# Patient Record
Sex: MALE | Race: BLACK OR AFRICAN AMERICAN | NOT HISPANIC OR LATINO | URBAN - METROPOLITAN AREA
[De-identification: names, ages, dates, MRNs, and addresses within clinical notes are randomized per-mention and may not be internally consistent; named-entity substitution may affect disease eponyms.]

---

## 2021-01-18 ENCOUNTER — INPATIENT (INPATIENT)
Facility: HOSPITAL | Age: 61
LOS: 3 days | Discharge: HOME | DRG: 552 | End: 2021-01-22
Attending: SURGERY | Admitting: SURGERY
Payer: MEDICAID

## 2021-01-18 VITALS
TEMPERATURE: 98 F | RESPIRATION RATE: 18 BRPM | OXYGEN SATURATION: 96 % | SYSTOLIC BLOOD PRESSURE: 161 MMHG | HEART RATE: 76 BPM | WEIGHT: 259.93 LBS | DIASTOLIC BLOOD PRESSURE: 90 MMHG

## 2021-01-18 DIAGNOSIS — S12.9XXA FRACTURE OF NECK, UNSPECIFIED, INITIAL ENCOUNTER: ICD-10-CM

## 2021-01-18 LAB
ALBUMIN SERPL ELPH-MCNC: 3.4 G/DL — SIGNIFICANT CHANGE UP (ref 3.3–5)
ALP SERPL-CCNC: 103 U/L — SIGNIFICANT CHANGE UP (ref 40–120)
ALT FLD-CCNC: 12 U/L — SIGNIFICANT CHANGE UP (ref 10–45)
ANION GAP SERPL CALC-SCNC: 10 MMOL/L — SIGNIFICANT CHANGE UP (ref 5–17)
APTT BLD: 36.3 SEC — HIGH (ref 27.5–35.5)
AST SERPL-CCNC: 11 U/L — SIGNIFICANT CHANGE UP (ref 10–40)
BASE EXCESS BLDV CALC-SCNC: 0.5 MMOL/L — SIGNIFICANT CHANGE UP (ref -2–2)
BASOPHILS # BLD AUTO: 0.01 K/UL — SIGNIFICANT CHANGE UP (ref 0–0.2)
BASOPHILS NFR BLD AUTO: 0.1 % — SIGNIFICANT CHANGE UP (ref 0–2)
BILIRUB SERPL-MCNC: 0.4 MG/DL — SIGNIFICANT CHANGE UP (ref 0.2–1.2)
BLD GP AB SCN SERPL QL: NEGATIVE — SIGNIFICANT CHANGE UP
BUN SERPL-MCNC: 11 MG/DL — SIGNIFICANT CHANGE UP (ref 7–23)
CA-I SERPL-SCNC: 1.21 MMOL/L — SIGNIFICANT CHANGE UP (ref 1.12–1.3)
CALCIUM SERPL-MCNC: 9.5 MG/DL — SIGNIFICANT CHANGE UP (ref 8.4–10.5)
CHLORIDE BLDV-SCNC: 108 MMOL/L — SIGNIFICANT CHANGE UP (ref 96–108)
CHLORIDE SERPL-SCNC: 104 MMOL/L — SIGNIFICANT CHANGE UP (ref 96–108)
CO2 BLDV-SCNC: 26 MMOL/L — SIGNIFICANT CHANGE UP (ref 22–30)
CO2 SERPL-SCNC: 23 MMOL/L — SIGNIFICANT CHANGE UP (ref 22–31)
CREAT SERPL-MCNC: 0.66 MG/DL — SIGNIFICANT CHANGE UP (ref 0.5–1.3)
EOSINOPHIL # BLD AUTO: 0.01 K/UL — SIGNIFICANT CHANGE UP (ref 0–0.5)
EOSINOPHIL NFR BLD AUTO: 0.1 % — SIGNIFICANT CHANGE UP (ref 0–6)
GAS PNL BLDV: 133 MMOL/L — LOW (ref 135–145)
GAS PNL BLDV: SIGNIFICANT CHANGE UP
GAS PNL BLDV: SIGNIFICANT CHANGE UP
GLUCOSE BLDC GLUCOMTR-MCNC: 169 MG/DL — HIGH (ref 70–99)
GLUCOSE BLDC GLUCOMTR-MCNC: 249 MG/DL — HIGH (ref 70–99)
GLUCOSE BLDV-MCNC: 119 MG/DL — HIGH (ref 70–99)
GLUCOSE SERPL-MCNC: 117 MG/DL — HIGH (ref 70–99)
HCO3 BLDV-SCNC: 25 MMOL/L — SIGNIFICANT CHANGE UP (ref 21–29)
HCT VFR BLD CALC: 35.7 % — LOW (ref 39–50)
HCT VFR BLDA CALC: 40 % — SIGNIFICANT CHANGE UP (ref 39–50)
HGB BLD CALC-MCNC: 13.1 G/DL — SIGNIFICANT CHANGE UP (ref 13–17)
HGB BLD-MCNC: 12 G/DL — LOW (ref 13–17)
IMM GRANULOCYTES NFR BLD AUTO: 0.1 % — SIGNIFICANT CHANGE UP (ref 0–1.5)
INR BLD: 1.14 RATIO — SIGNIFICANT CHANGE UP (ref 0.88–1.16)
LACTATE BLDV-MCNC: 0.9 MMOL/L — SIGNIFICANT CHANGE UP (ref 0.7–2)
LYMPHOCYTES # BLD AUTO: 2.06 K/UL — SIGNIFICANT CHANGE UP (ref 1–3.3)
LYMPHOCYTES # BLD AUTO: 24.1 % — SIGNIFICANT CHANGE UP (ref 13–44)
MCHC RBC-ENTMCNC: 30.4 PG — SIGNIFICANT CHANGE UP (ref 27–34)
MCHC RBC-ENTMCNC: 33.6 GM/DL — SIGNIFICANT CHANGE UP (ref 32–36)
MCV RBC AUTO: 90.4 FL — SIGNIFICANT CHANGE UP (ref 80–100)
MONOCYTES # BLD AUTO: 0.66 K/UL — SIGNIFICANT CHANGE UP (ref 0–0.9)
MONOCYTES NFR BLD AUTO: 7.7 % — SIGNIFICANT CHANGE UP (ref 2–14)
NEUTROPHILS # BLD AUTO: 5.81 K/UL — SIGNIFICANT CHANGE UP (ref 1.8–7.4)
NEUTROPHILS NFR BLD AUTO: 67.9 % — SIGNIFICANT CHANGE UP (ref 43–77)
NRBC # BLD: 0 /100 WBCS — SIGNIFICANT CHANGE UP (ref 0–0)
OTHER CELLS CSF MANUAL: 17 ML/DL — LOW (ref 18–22)
PCO2 BLDV: 42 MMHG — SIGNIFICANT CHANGE UP (ref 35–50)
PH BLDV: 7.39 — SIGNIFICANT CHANGE UP (ref 7.35–7.45)
PLATELET # BLD AUTO: 300 K/UL — SIGNIFICANT CHANGE UP (ref 150–400)
PO2 BLDV: 68 MMHG — HIGH (ref 25–45)
POTASSIUM BLDV-SCNC: 3.6 MMOL/L — SIGNIFICANT CHANGE UP (ref 3.5–5.3)
POTASSIUM SERPL-MCNC: 3.8 MMOL/L — SIGNIFICANT CHANGE UP (ref 3.5–5.3)
POTASSIUM SERPL-SCNC: 3.8 MMOL/L — SIGNIFICANT CHANGE UP (ref 3.5–5.3)
PROT SERPL-MCNC: 6.3 G/DL — SIGNIFICANT CHANGE UP (ref 6–8.3)
PROTHROM AB SERPL-ACNC: 13.6 SEC — SIGNIFICANT CHANGE UP (ref 10.6–13.6)
RBC # BLD: 3.95 M/UL — LOW (ref 4.2–5.8)
RBC # FLD: 14.3 % — SIGNIFICANT CHANGE UP (ref 10.3–14.5)
RH IG SCN BLD-IMP: POSITIVE — SIGNIFICANT CHANGE UP
SAO2 % BLDV: 93 % — HIGH (ref 67–88)
SARS-COV-2 RNA SPEC QL NAA+PROBE: SIGNIFICANT CHANGE UP
SODIUM SERPL-SCNC: 137 MMOL/L — SIGNIFICANT CHANGE UP (ref 135–145)
WBC # BLD: 8.56 K/UL — SIGNIFICANT CHANGE UP (ref 3.8–10.5)
WBC # FLD AUTO: 8.56 K/UL — SIGNIFICANT CHANGE UP (ref 3.8–10.5)

## 2021-01-18 PROCEDURE — 99285 EMERGENCY DEPT VISIT HI MDM: CPT

## 2021-01-18 RX ORDER — DEXTROSE 50 % IN WATER 50 %
12.5 SYRINGE (ML) INTRAVENOUS ONCE
Refills: 0 | Status: DISCONTINUED | OUTPATIENT
Start: 2021-01-18 | End: 2021-01-22

## 2021-01-18 RX ORDER — DEXTROSE 50 % IN WATER 50 %
15 SYRINGE (ML) INTRAVENOUS ONCE
Refills: 0 | Status: DISCONTINUED | OUTPATIENT
Start: 2021-01-18 | End: 2021-01-20

## 2021-01-18 RX ORDER — METOPROLOL TARTRATE 50 MG
25 TABLET ORAL DAILY
Refills: 0 | Status: DISCONTINUED | OUTPATIENT
Start: 2021-01-18 | End: 2021-01-22

## 2021-01-18 RX ORDER — DEXTROSE 50 % IN WATER 50 %
25 SYRINGE (ML) INTRAVENOUS ONCE
Refills: 0 | Status: DISCONTINUED | OUTPATIENT
Start: 2021-01-18 | End: 2021-01-20

## 2021-01-18 RX ORDER — ENOXAPARIN SODIUM 100 MG/ML
40 INJECTION SUBCUTANEOUS DAILY
Refills: 0 | Status: DISCONTINUED | OUTPATIENT
Start: 2021-01-18 | End: 2021-01-22

## 2021-01-18 RX ORDER — RISPERIDONE 4 MG/1
3 TABLET ORAL DAILY
Refills: 0 | Status: DISCONTINUED | OUTPATIENT
Start: 2021-01-18 | End: 2021-01-20

## 2021-01-18 RX ORDER — LEVOTHYROXINE SODIUM 125 MCG
25 TABLET ORAL DAILY
Refills: 0 | Status: DISCONTINUED | OUTPATIENT
Start: 2021-01-18 | End: 2021-01-22

## 2021-01-18 RX ORDER — DOXAZOSIN MESYLATE 4 MG
8 TABLET ORAL AT BEDTIME
Refills: 0 | Status: DISCONTINUED | OUTPATIENT
Start: 2021-01-18 | End: 2021-01-22

## 2021-01-18 RX ORDER — SODIUM CHLORIDE 9 MG/ML
1000 INJECTION, SOLUTION INTRAVENOUS
Refills: 0 | Status: DISCONTINUED | OUTPATIENT
Start: 2021-01-18 | End: 2021-01-22

## 2021-01-18 RX ORDER — SODIUM CHLORIDE 9 MG/ML
1000 INJECTION, SOLUTION INTRAVENOUS
Refills: 0 | Status: DISCONTINUED | OUTPATIENT
Start: 2021-01-18 | End: 2021-01-20

## 2021-01-18 RX ORDER — ATORVASTATIN CALCIUM 80 MG/1
40 TABLET, FILM COATED ORAL AT BEDTIME
Refills: 0 | Status: DISCONTINUED | OUTPATIENT
Start: 2021-01-18 | End: 2021-01-22

## 2021-01-18 RX ORDER — ROSUVASTATIN CALCIUM 5 MG/1
1 TABLET ORAL
Qty: 0 | Refills: 0 | DISCHARGE

## 2021-01-18 RX ORDER — INSULIN LISPRO 100/ML
VIAL (ML) SUBCUTANEOUS AT BEDTIME
Refills: 0 | Status: DISCONTINUED | OUTPATIENT
Start: 2021-01-18 | End: 2021-01-20

## 2021-01-18 RX ORDER — INSULIN LISPRO 100/ML
VIAL (ML) SUBCUTANEOUS
Refills: 0 | Status: DISCONTINUED | OUTPATIENT
Start: 2021-01-18 | End: 2021-01-20

## 2021-01-18 RX ORDER — GLUCAGON INJECTION, SOLUTION 0.5 MG/.1ML
1 INJECTION, SOLUTION SUBCUTANEOUS ONCE
Refills: 0 | Status: DISCONTINUED | OUTPATIENT
Start: 2021-01-18 | End: 2021-01-20

## 2021-01-18 RX ADMIN — Medication 8 MILLIGRAM(S): at 22:16

## 2021-01-18 RX ADMIN — ATORVASTATIN CALCIUM 40 MILLIGRAM(S): 80 TABLET, FILM COATED ORAL at 22:16

## 2021-01-18 RX ADMIN — Medication 25 MICROGRAM(S): at 11:54

## 2021-01-18 RX ADMIN — Medication 25 MILLIGRAM(S): at 11:54

## 2021-01-18 RX ADMIN — RISPERIDONE 3 MILLIGRAM(S): 4 TABLET ORAL at 11:54

## 2021-01-18 NOTE — ED PROVIDER NOTE - OBJECTIVE STATEMENT
60M PMH BPH HTN HLD DM schizophrenia, 60M PMH BPH HTN HLD DM schizophrenia transferred from [facility] ED C4-5 transverse process fracture after unwitnessed fall from standing near bathroom w/ unknown downtime. As per ED transfer paperwork, pt originally endorsed L sided shoulder pain which pt does not currently endorse, pt unable to explain if he has weakness/chronic pain in his left shoulder. Pt has sluggish response to questions, only responds to questions intermittently, but follows commands. Pt states he feels well, has no complaints, does not remember how he fell, states he feels like he can walk but has been feeling weak. From facility, pt had L shoulder xrays showing no fracture but suggestive of chronic rotator cuff atrophy, neg pelvis + chest xrays, c4-5 fx on CT.

## 2021-01-18 NOTE — CONSULT NOTE ADULT - ASSESSMENT
CRYSTAL GRIJALVA  60M w/ pmh of schizophrenia and lives in a group home presents after a unwitnessed fall. Patient states he tripped over his feet in the bathroom and fell onto his L shoulder. Initially was complaining of L shoulder pain. No headache, neck pain, or radiculopathy or numbness. on daily asa. At OSH , L TP fx at c4 and 5 both extending into transverse foramen. CT neg, placed in C collar.  Exam: aox3, FC, perrl RUE 5/5, LUE Delt 2/3, otherwise 5/5, no hoffmans, LE 5/5.   - no acute neurosurgical intervention  -CTA neck to assess vertebral artery on L  -MRI c spine CRYSTAL GRIJALVA  60M w/ pmh of schizophrenia and lives in a group home presents after a unwitnessed fall. Patient states he tripped over his feet in the bathroom and fell onto his L shoulder. Initially was complaining of L shoulder pain. No headache, neck pain, or radiculopathy or numbness. on daily asa. At OSH , L TP fx at c4 and 5 both extending into transverse foramen. CT neg, placed in C collar. Patient is a poor historian and is very slow to respond.   Exam: aox3, FC, perrl RUE 5/5, LUE Delt 2/3, otherwise 5/5, no hoffmans, LE 5/5.   - no acute neurosurgical intervention  -CTA neck to assess vertebral artery on L  -MRI c spine, potential CDU candidate  -cont c collar   CRYSTAL GRIJALVA  60M w/ pmh of schizophrenia and lives in a group home presents after a unwitnessed fall. Patient states he tripped over his feet in the bathroom and fell onto his L shoulder. Initially was complaining of L shoulder pain. No headache, neck pain, or radiculopathy or numbness. on daily asa. At OSH , L TP fx at c4 and 5 both extending into transverse foramen. CT neg, placed in C collar. Patient is a poor historian and is very slow to respond.   Exam: aox3, FC, perrl RUE 5/5, LUE Delt 2/3, otherwise 5/5, no hoffmans, LE 5/5.   - no acute neurosurgical intervention  -MRI/ MRA c spine, potential CDU candidate  -cont c collar

## 2021-01-18 NOTE — H&P ADULT - NSHPREVIEWOFSYSTEMS_GEN_ALL_CORE
REVIEW OF SYSTEMS:  General: denies weight change, fever or fatigue  HEENT: denies sore throat, hoarseness  Respiratory: denies cough, shortness of breath at rest and on exertion, wheezing  Cardiovascular: denies chest pain, abnormal heart rhythm, PND, palpitations  Gastrointestinal: denies nausea, vomiting, diarrhea, bloody or black bowel movements  Genitourinary: denies frequent urination, painful urination, kidney disease  Neurological: denies seizures, headaches  Muscoloskeletal: denies any joint pains  Psychiatric: denies depression, anxiety

## 2021-01-18 NOTE — ED PROVIDER NOTE - ATTENDING CONTRIBUTION TO CARE
Pt transferred from OSH due to newly found c4/c5 transverse process fx from unwitnessed fall. imaging there otherwise neg for acute injury, other than L shoulder showing signs of rotator cuff arthropaty. this is consistent w pt's exam with limited L arm abduction past 45 deg. otherwise pt appears to be neuro intact without any acute deficits. clinical picture currently not showing signs of spinal cord injury. trauma surgery and neuro surgery consulted and aware. At the end of my shift, I signed off the care of the patient to oncoming physician. Plan is for await fianl trauma and neursurg recs, admission for further care.

## 2021-01-18 NOTE — PATIENT PROFILE ADULT - HARM RISK FACTORS
Abnormal liver function test    Cancer  Ovarian cancer s/p chemo 2012  Coronary artery disease involving native coronary artery of native heart, angina presence unspecified  MI  2011  Diabetes    Gastroesophageal reflux disease without esophagitis    High cholesterol    HTN (hypertension)    Obesity (BMI 30-39.9) yes

## 2021-01-18 NOTE — H&P ADULT - NSHPPHYSICALEXAM_GEN_ALL_CORE
General: well developed, well nourished, NAD  Neuro: alert and oriented, no focal deficits, moves all extremities spontaneously, C-collar in place, motor and sensation intact b/l UE and LE, RUE weakness to adduction >90degrees  HEENT: NCAT, EOMI, anicteric, mucosa moist  Respiratory: airway patent, respirations unlabored  CVS: regular rate and rhythm  Abdomen: soft, nontender, nondistended  Extremities: no edema, sensation and movement grossly intact  Skin: warm, dry, appropriate color

## 2021-01-18 NOTE — ED ADULT NURSE NOTE - NSIMPLEMENTINTERV_GEN_ALL_ED
Implemented All Fall Risk Interventions:  [place] to call system. Call bell, personal items and telephone within reach. Instruct patient to call for assistance. Room bathroom lighting operational. Non-slip footwear when patient is off stretcher. Physically safe environment: no spills, clutter or unnecessary equipment. Stretcher in lowest position, wheels locked, appropriate side rails in place. Provide visual cue, wrist band, yellow gown, etc. Monitor gait and stability. Monitor for mental status changes and reorient to person, place, and time. Review medications for side effects contributing to fall risk. Reinforce activity limits and safety measures with patient and family.

## 2021-01-18 NOTE — H&P ADULT - ASSESSMENT
60M PMH BPH, HTN, HLD, DM2, schizophrenia, seizures, residing at [facility] presenting after unwitnessed fall on ASA, found to have L C4-5 TP fx.     - Admit to Trauma Surgery  - Appreciate Neurosurgical evaluation. Pending MRI/MRA for evaluation of ligamentous injury and BCVI. Per Neurosurgery no concern for spinal cord injury given exam and imaging findings.  - R deltoid weakness likely 2/2 chronic rotator cuff injury  - Med rec to be completed, will contact patient's residence for further information  - DVT ppx: lovenox  - Regular diet  - PT donald Amador, PGY2  Trauma/ACS [id number]

## 2021-01-18 NOTE — ED PROVIDER NOTE - CARE PLAN
Principal Discharge DX:	Cervical spine fracture   Principal Discharge DX:	Cervical spine fracture  Secondary Diagnosis:	Schizophrenia, unspecified type  Secondary Diagnosis:	Rotator cuff arthropathy of left shoulder

## 2021-01-18 NOTE — ED PROVIDER NOTE - PHYSICAL EXAMINATION
CONSTITUTIONAL: comfortable appearing, in c-collar  SKIN: Warm dry, normal skin turgor, no abrasions/lacerations visible on exam  EYES: EOMI, PERRLA, no scleral icterus, conjunctiva pink  NECK: in C-collar  CARD: RRR, no murmurs.  RESP: clear to ausculation b/l. No crackles or wheezing. No chest wall or clavicular tenderness  ABD: soft, non-tender, non-distended, no rebound or guarding.  EXT:  No bony tenderness over L shoulder 5/5 str upper and lower extremities except for 4/5 L shoulder flexion, adduction, 5/5  strength, no pedal edema, no calf tenderness  NEURO: Motor exam as above. Pt did not endorse sensory deficits, Pt did not cooperate w/ cranial nerve exam. Pt has shuffling gait, states he feels unsteady without assistance  PSYCH: sluggish intermittent response, flat affect CONSTITUTIONAL: comfortable appearing, in c-collar  SKIN: Warm dry, normal skin turgor, no abrasions/lacerations visible on exam  EYES: EOMI, PERRLA, no scleral icterus, conjunctiva pink  NECK: in C-collar  CARD: RRR, no murmurs.  RESP: clear to ausculation b/l. No crackles or wheezing. No chest wall or clavicular tenderness  ABD: soft, non-tender, non-distended, no rebound or guarding.  EXT:  No bony tenderness over L shoulder 5/5 str upper and lower extremities except decreased adduction of L shoulder above 45 deg, 5/5  strength, no pedal edema, no calf tenderness  NEURO: Motor exam without deficits, limited by inability to adduct L shoulder. Pt did not endorse sensory deficits, Pt did not cooperate w/ cranial nerve exam. Pt has shuffling gait, states he feels unsteady without assistance  PSYCH: sluggish intermittent response, flat affect

## 2021-01-18 NOTE — ED PROVIDER NOTE - CLINICAL SUMMARY MEDICAL DECISION MAKING FREE TEXT BOX
60M PMH BPH HTN HLD DM schizophrenia transferred from [place] ED C4-5 transverse process fracture after unwitnessed fall from standing near bathroom w/ unknown downtime, c4-5 transverse process fx, L shoulder flexion weakness, Neurosx consult for cspine fracture, pre-op labs, trauma evaluation, consider re-imaging if necessary

## 2021-01-18 NOTE — H&P ADULT - HISTORY OF PRESENT ILLNESS
60M PMH BPH, HTN, HLD, DM2, schizophrenia, seizures, residing at [facility] presenting after unwitnessed fall on ASA per OSH. Patient states that he tripped over his feet in the bathroom, fell onto left shoulder, presented to OSH with left shoulder pain. CT head, C-spine obtained showing no intracranial injury, fractures of left C4-C5 transverse processes. Left shoulder Xray showing no fracture or dislocation, degenerative changes with evidence of chronic rotator cuff. Pelvic Xray showing severe degenerative change involving both hip joints, no evidence of acute process. Patient transferred to [facility] ED for Trauma and Neurosurgery evaluation.     Upon evaluation in [facility] ED patient denies any pain, denies LOC, no complaints.

## 2021-01-18 NOTE — CONSULT NOTE ADULT - SUBJECTIVE AND OBJECTIVE BOX
p ([id number])     HPI:    60M w/ pmh of schizophrenia and lives in a group home presents after a unwitnessed fall. Patient states he tripped over his feet in the bathroom and fell onto his L shoulder. Initially was complaining of L shoulder pain. No headache, neck pain, or radiculopathy or numbness. on daily asa.     Exam:  aaox3, FC, perrl RUE 5/5, LUE Delt 2/3, otherwise 5/5, no hoffmans, LE 5/5.     --Anticoagulation:    =====================  PAST MEDICAL HISTORY     PAST SURGICAL HISTORY         MEDICATIONS:  Antibiotics:    Neuro:    Other:      SOCIAL HISTORY:   Occupation:   Marital Status:     FAMILY HISTORY:      ROS: Negative except per HPI    LABS:                          12.0   8.56  )-----------( 300      ( 18 Jan 2021 04:36 )             35.7

## 2021-01-18 NOTE — ED PROVIDER NOTE - NS ED ROS FT
Constitutional:  (-) fever, (-) chills, (-) lethargy  Cardiac: (-) chest pain (-) palpitations  Respiratory:  (-) cough (-) respiratory distress.   GI:  (-) nausea (-) vomiting (-) abdominal pain.  MS:  (-) back pain (-) joint pain.  Neuro:  (-) headache (-) numbness (-) tingling

## 2021-01-18 NOTE — ED ADULT NURSE NOTE - OBJECTIVE STATEMENT
patient is a 60 year old male with a PMH of seizures who BIBEMS as a transfer from [facility]. as per EMS patient had an unwitnessed fall at his assisted living facility. unknown mechanism of fall. unknown LOC or head injury. patient is awake, aaox3 with delayed speech (patient baseline), PERRL pupils 2mm bilaterally, strength and sensation equal to upper and lower extremities bilaterally, lungs CTA bilaterally, abd soft, nondistended, nontender, cap refill <3, radial pulses +2 bilaterally. patient denies chest pain, shortness of breath, ha, dizziness, weakness, numbness or tingling, fever, chills, cough, abd pain, back pain, changes in bowel or bladder, hematuria, bloody stool. patient resting on stretcher. MD at bedside to assess. see chart for neuroflowsheet. c-collar placed before patient arrival.

## 2021-01-18 NOTE — H&P ADULT - NSHPLABSRESULTS_GEN_ALL_CORE
12.0   8.56  )-----------( 300      ( 18 Jan 2021 04:36 )             35.7       01-18    137  |  104  |  11  ----------------------------<  117<H>  3.8   |  23  |  0.66    Ca    9.5      18 Jan 2021 04:36    TPro  6.3  /  Alb  3.4  /  TBili  0.4  /  DBili  x   /  AST  11  /  ALT  12  /  AlkPhos  103  01-18                  PT/INR - ( 18 Jan 2021 04:36 )   PT: 13.6 sec;   INR: 1.14 ratio         PTT - ( 18 Jan 2021 04:36 )  PTT:36.3 sec    Lactate Trend            CAPILLARY BLOOD GLUCOSE

## 2021-01-19 DIAGNOSIS — R45.1 RESTLESSNESS AND AGITATION: ICD-10-CM

## 2021-01-19 DIAGNOSIS — F20.9 SCHIZOPHRENIA, UNSPECIFIED: ICD-10-CM

## 2021-01-19 LAB
A1C WITH ESTIMATED AVERAGE GLUCOSE RESULT: 5.6 % — SIGNIFICANT CHANGE UP (ref 4–5.6)
ANION GAP SERPL CALC-SCNC: 9 MMOL/L — SIGNIFICANT CHANGE UP (ref 5–17)
BUN SERPL-MCNC: 13 MG/DL — SIGNIFICANT CHANGE UP (ref 7–23)
CALCIUM SERPL-MCNC: 9.3 MG/DL — SIGNIFICANT CHANGE UP (ref 8.4–10.5)
CHLORIDE SERPL-SCNC: 102 MMOL/L — SIGNIFICANT CHANGE UP (ref 96–108)
CO2 SERPL-SCNC: 25 MMOL/L — SIGNIFICANT CHANGE UP (ref 22–31)
CREAT SERPL-MCNC: 0.88 MG/DL — SIGNIFICANT CHANGE UP (ref 0.5–1.3)
ESTIMATED AVERAGE GLUCOSE: 114 MG/DL — SIGNIFICANT CHANGE UP (ref 68–114)
GLUCOSE BLDC GLUCOMTR-MCNC: 181 MG/DL — HIGH (ref 70–99)
GLUCOSE SERPL-MCNC: 94 MG/DL — SIGNIFICANT CHANGE UP (ref 70–99)
HCT VFR BLD CALC: 37.7 % — LOW (ref 39–50)
HGB BLD-MCNC: 12.3 G/DL — LOW (ref 13–17)
MAGNESIUM SERPL-MCNC: 1.9 MG/DL — SIGNIFICANT CHANGE UP (ref 1.6–2.6)
MCHC RBC-ENTMCNC: 30 PG — SIGNIFICANT CHANGE UP (ref 27–34)
MCHC RBC-ENTMCNC: 32.6 GM/DL — SIGNIFICANT CHANGE UP (ref 32–36)
MCV RBC AUTO: 92 FL — SIGNIFICANT CHANGE UP (ref 80–100)
NRBC # BLD: 0 /100 WBCS — SIGNIFICANT CHANGE UP (ref 0–0)
PHOSPHATE SERPL-MCNC: 3.4 MG/DL — SIGNIFICANT CHANGE UP (ref 2.5–4.5)
PLATELET # BLD AUTO: 303 K/UL — SIGNIFICANT CHANGE UP (ref 150–400)
POTASSIUM SERPL-MCNC: 4.2 MMOL/L — SIGNIFICANT CHANGE UP (ref 3.5–5.3)
POTASSIUM SERPL-SCNC: 4.2 MMOL/L — SIGNIFICANT CHANGE UP (ref 3.5–5.3)
RBC # BLD: 4.1 M/UL — LOW (ref 4.2–5.8)
RBC # FLD: 14.6 % — HIGH (ref 10.3–14.5)
SODIUM SERPL-SCNC: 136 MMOL/L — SIGNIFICANT CHANGE UP (ref 135–145)
WBC # BLD: 7.59 K/UL — SIGNIFICANT CHANGE UP (ref 3.8–10.5)
WBC # FLD AUTO: 7.59 K/UL — SIGNIFICANT CHANGE UP (ref 3.8–10.5)

## 2021-01-19 PROCEDURE — 99232 SBSQ HOSP IP/OBS MODERATE 35: CPT

## 2021-01-19 PROCEDURE — 93010 ELECTROCARDIOGRAM REPORT: CPT

## 2021-01-19 PROCEDURE — 90792 PSYCH DIAG EVAL W/MED SRVCS: CPT

## 2021-01-19 RX ORDER — DIVALPROEX SODIUM 500 MG/1
1000 TABLET, DELAYED RELEASE ORAL AT BEDTIME
Refills: 0 | Status: DISCONTINUED | OUTPATIENT
Start: 2021-01-19 | End: 2021-01-22

## 2021-01-19 RX ORDER — DIVALPROEX SODIUM 500 MG/1
750 TABLET, DELAYED RELEASE ORAL DAILY
Refills: 0 | Status: DISCONTINUED | OUTPATIENT
Start: 2021-01-19 | End: 2021-01-22

## 2021-01-19 RX ORDER — MAGNESIUM SULFATE 500 MG/ML
2 VIAL (ML) INJECTION ONCE
Refills: 0 | Status: DISCONTINUED | OUTPATIENT
Start: 2021-01-19 | End: 2021-01-19

## 2021-01-19 RX ORDER — HALOPERIDOL DECANOATE 100 MG/ML
5 INJECTION INTRAMUSCULAR EVERY 6 HOURS
Refills: 0 | Status: DISCONTINUED | OUTPATIENT
Start: 2021-01-19 | End: 2021-01-22

## 2021-01-19 RX ORDER — SODIUM CHLORIDE 9 MG/ML
1000 INJECTION, SOLUTION INTRAVENOUS
Refills: 0 | Status: DISCONTINUED | OUTPATIENT
Start: 2021-01-19 | End: 2021-01-20

## 2021-01-19 RX ORDER — DIPHENHYDRAMINE HCL 50 MG
25 CAPSULE ORAL EVERY 6 HOURS
Refills: 0 | Status: DISCONTINUED | OUTPATIENT
Start: 2021-01-19 | End: 2021-01-22

## 2021-01-19 RX ADMIN — RISPERIDONE 3 MILLIGRAM(S): 4 TABLET ORAL at 14:51

## 2021-01-19 RX ADMIN — Medication 25 MILLIGRAM(S): at 18:05

## 2021-01-19 RX ADMIN — Medication 25 MICROGRAM(S): at 05:48

## 2021-01-19 RX ADMIN — ENOXAPARIN SODIUM 40 MILLIGRAM(S): 100 INJECTION SUBCUTANEOUS at 14:50

## 2021-01-19 RX ADMIN — Medication 8 MILLIGRAM(S): at 22:27

## 2021-01-19 RX ADMIN — Medication 2 MILLIGRAM(S): at 11:42

## 2021-01-19 RX ADMIN — Medication 25 MILLIGRAM(S): at 11:42

## 2021-01-19 RX ADMIN — Medication 2 MILLIGRAM(S): at 18:04

## 2021-01-19 RX ADMIN — HALOPERIDOL DECANOATE 5 MILLIGRAM(S): 100 INJECTION INTRAMUSCULAR at 11:42

## 2021-01-19 RX ADMIN — DIVALPROEX SODIUM 750 MILLIGRAM(S): 500 TABLET, DELAYED RELEASE ORAL at 14:50

## 2021-01-19 RX ADMIN — Medication 25 MILLIGRAM(S): at 05:48

## 2021-01-19 RX ADMIN — DIVALPROEX SODIUM 1000 MILLIGRAM(S): 500 TABLET, DELAYED RELEASE ORAL at 22:27

## 2021-01-19 RX ADMIN — HALOPERIDOL DECANOATE 5 MILLIGRAM(S): 100 INJECTION INTRAMUSCULAR at 18:04

## 2021-01-19 RX ADMIN — ATORVASTATIN CALCIUM 40 MILLIGRAM(S): 80 TABLET, FILM COATED ORAL at 22:27

## 2021-01-19 NOTE — DISCHARGE NOTE PROVIDER - HOSPITAL COURSE
60M PMH BPH, HTN, HLD, DM2, schizophrenia, seizures, residing at [facility] presenting after unwitnessed fall on ASA per OSH. Patient states that he tripped over his feet in the bathroom, fell onto left shoulder, presented to OSH with left shoulder pain. CT head, C-spine obtained showing no intracranial injury, fractures of left C4-C5 transverse processes. Left shoulder Xray showing no fracture or dislocation, degenerative changes with evidence of chronic rotator cuff. Pelvic Xray showing severe degenerative change involving both hip joints, no evidence of acute process. Patient transferred to [facility] ED for Trauma and Neurosurgery evaluation.     Upon evaluation in [facility] ED patient denies any pain, denies LOC, no complaints. Admit to Trauma Surgery.  Appreciate Neurosurgical evaluation. Pending MRI/MRA for evaluation of ligamentous injury and BCVI. Per Neurosurgery no concern for spinal cord injury given exam and imaging findings.  R deltoid weakness likely 2/2 chronic rotator cuff injury.  Psych consulted: rec meds for acute agitation and restarted home meds.  Patient had MRI showed _____________________. PT Eval: ______________.    60M PMH BPH, HTN, HLD, DM2, schizophrenia, seizures, residing at [facility] presenting after unwitnessed fall on ASA per OSH. Patient states that he tripped over his feet in the bathroom, fell onto left shoulder, presented to OSH with left shoulder pain. CT head, C-spine obtained showing no intracranial injury, fractures of left C4-C5 transverse processes. Left shoulder Xray showing no fracture or dislocation, degenerative changes with evidence of chronic rotator cuff. Pelvic Xray showing severe degenerative change involving both hip joints, no evidence of acute process. Patient transferred to [facility] ED for Trauma and Neurosurgery evaluation.     Upon evaluation in [facility] ED patient denies any pain, denies LOC, no complaints. Admit to Trauma Surgery.  Appreciate Neurosurgical evaluation. Pending MRI/MRA for evaluation of ligamentous injury and BCVI. Per Neurosurgery no concern for spinal cord injury given exam and imaging findings.  R deltoid weakness likely 2/2 chronic rotator cuff injury.  Psych consulted: rec meds for acute agitation and restarted home meds.  Patient had MRI showed: neck: No traumatic vascular injury including at C4-C5 levels. MRA head: Atherosclerotic changes with mild to moderate stenosis of the right intracranial ICA and mild stenosis of the right PCA. PT recommended returning to group home with previous level of assist, as well as outpatient physical therapy services. A tertiary survey was attempted and patient refused. Psychiatry recommended Haldol 5mg IM q 6hrs prn agitation, Ativan 2mg IM q6hrs prn agitation and Benadryl 25mg IM q6hrs prn (for EPS prophylaxis). Neurosurgery reviewed the MRA neck and MR c-spine. No vert injury on MRA Trace L 4,5,6 facet edema, otherwise no ligamentous injury. Pt cleared to D/C C-collar. Pt should follow up outpatient with Dr. Pena. Pt will be discharged back to his group home. On day of discharge, the patients vitals are stable, he was tolerating diet, voiding adequatley, ambulating well and pain controlled. Pt will f/u with Dr. Pineda in 2 weeks and will f/u with his PCP in 1-2 weeks.   60M PMH BPH, HTN, HLD, DM2, schizophrenia, seizures, residing at [facility] presenting after unwitnessed fall on ASA per OSH. Patient states that he tripped over his feet in the bathroom, fell onto left shoulder, presented to OSH with left shoulder pain. CT head, C-spine obtained showing no intracranial injury, fractures of left C4-C5 transverse processes. Left shoulder Xray showing no fracture or dislocation, degenerative changes with evidence of chronic rotator cuff. Pelvic Xray showing severe degenerative change involving both hip joints, no evidence of acute process. Patient transferred to [facility] ED for Trauma and Neurosurgery evaluation.     Upon evaluation in [facility] ED patient denies any pain, denies LOC, no complaints. Admit to Trauma Surgery.  Appreciate Neurosurgical evaluation. Pending MRI/MRA for evaluation of ligamentous injury and BCVI. Per Neurosurgery no concern for spinal cord injury given exam and imaging findings.  R deltoid weakness likely 2/2 chronic rotator cuff injury.  Psych consulted: rec meds for acute agitation and restarted home meds.  Patient had MRI showed: neck: No traumatic vascular injury including at C4-C5 levels. MRA head: Atherosclerotic changes with mild to moderate stenosis of the right intracranial ICA and mild stenosis of the right PCA. PT recommended returning to group home with previous level of assist, as well as outpatient physical therapy services. A tertiary survey was attempted and patient refused. Psychiatry recommended Haldol 5mg IM q 6hrs prn agitation, Ativan 2mg IM q6hrs prn agitation and Benadryl 25mg IM q6hrs prn (for EPS prophylaxis). Neurosurgery reviewed the MRA neck and MR c-spine. No vert injury on MRA Trace L 4,5,6 facet edema, otherwise no ligamentous injury. Pt cleared to D/C C-collar. Pt should follow up outpatient with Dr. Pena. Pt will be discharged back to his group home. On day of discharge, the patients vitals are stable, he was tolerating diet, voiding adequatley, ambulating well and pain controlled. Pt will f/u with Dr. Pineda in 2 weeks and will f/u with his PCP in 1-2 weeks.

## 2021-01-19 NOTE — DISCHARGE NOTE PROVIDER - CARE PROVIDERS DIRECT ADDRESSES
[email or web address]

## 2021-01-19 NOTE — PROGRESS NOTE ADULT - SUBJECTIVE AND OBJECTIVE BOX
p ([id number])     HPI:    60M w/ pmh of schizophrenia and lives in a group home presents after a unwitnessed fall. Patient states he tripped over his feet in the bathroom and fell onto his L shoulder. Initially was complaining of L shoulder pain. No headache, neck pain, or radiculopathy or numbness. on daily asa.     Exam:  aaox3, FC, perrl RUE 5/5, LUE Delt 2/3, otherwise 5/5, no hoffmans, LE 5/5.     --Anticoagulation:    =====================  PAST MEDICAL HISTORY     PAST SURGICAL HISTORY         MEDICATIONS:  Antibiotics:    Neuro:    Other:      SOCIAL HISTORY:   Occupation:   Marital Status:     FAMILY HISTORY:      ROS: Negative except per HPI    LABS:                          12.0   8.56  )-----------( 300      ( 18 Jan 2021 04:36 )             35.7

## 2021-01-19 NOTE — BEHAVIORAL HEALTH ASSESSMENT NOTE - NSBHCHARTREVIEWLAB_PSY_A_CORE FT
12.3   7.59  )-----------( 303      ( 19 Jan 2021 06:49 )             37.7   01-19    136  |  102  |  13  ----------------------------<  94  4.2   |  25  |  0.88    Ca    9.3      19 Jan 2021 06:42  Phos  3.4     01-19  Mg     1.9     01-19    TPro  6.3  /  Alb  3.4  /  TBili  0.4  /  DBili  x   /  AST  11  /  ALT  12  /  AlkPhos  103  01-18

## 2021-01-19 NOTE — BEHAVIORAL HEALTH ASSESSMENT NOTE - HYGIENE
What Is The Reason For Today's Visit?: Full Body Skin Examination
What Is The Reason For Today's Visit? (Being Monitored For X): concerning skin lesions on an annual basis
How Severe Are Your Spot(S)?: moderate
Fair

## 2021-01-19 NOTE — PHYSICAL THERAPY INITIAL EVALUATION ADULT - ADDITIONAL COMMENTS
Pt unable to provide accurate social history, as per progress notes, Pt has a history of schizophrenia and lives in a group home. Pt unable to provide accurate social history, as per progress notes, Pt has a history of schizophrenia and lives in a group home. Pt was independent with all functional mobility without the use of an AD.

## 2021-01-19 NOTE — DISCHARGE NOTE PROVIDER - NSFOLLOWUPCLINICS_GEN_ALL_ED_FT
[facility] Psychiatry  Psychiatry  [id number] [place] [address]  Phone: [phone]  Fax:   Follow Up Time:

## 2021-01-19 NOTE — BEHAVIORAL HEALTH ASSESSMENT NOTE - NSBHCONSULTMEDAGITATION_PSY_A_CORE FT
Haldol 5mg IM q6hrs prn Agitation  Ativan 2mg IM q6hrs prn Agitation  Benadryl 25mg IM q6 hrs prn EPS prevention. Haldol 5mg IM q6hrs prn Agitation  Ativan 2mg IM q6hrs prn Agitation  Benadryl 25mg IM q6hrs prn was ordered  Benadryl 25mg IM q6 hrs prn EPS prevention.

## 2021-01-19 NOTE — PHYSICAL THERAPY INITIAL EVALUATION ADULT - PERTINENT HX OF CURRENT PROBLEM, REHAB EVAL
Pt is a 61 y/o M with PMH of HTN, DM2, schizophrenia, & seizures who presented after unwitnessed fall on ASA per OSH. Stated that he tripped over his feet in the bathroom and fell onto L shoulder. CT head & C-spine showed no intracranial injury, however revealed fractures of left C4-C5 transverse processes. Left shoulder X-Ray revealed no fracture or dislocation, degenerative changes with evidence of chronic rotator cuff. Pelvic X-Ray showed no evidence of acute process.

## 2021-01-19 NOTE — BEHAVIORAL HEALTH ASSESSMENT NOTE - RISK ASSESSMENT
Low Acute Suicide Risk pt remains a chronic risk to himself because of his diagnosis and paranoid psychosis but is not an acute risk to harm himself or others and has no current thoughts of harming himself or others.

## 2021-01-19 NOTE — PHYSICAL THERAPY INITIAL EVALUATION ADULT - MANUAL MUSCLE TESTING RESULTS, REHAB EVAL
Unable to assess due to pt not cooperating or following commands BLE/BUE: grossly assessed at least equal to or greater than 3/5

## 2021-01-19 NOTE — BEHAVIORAL HEALTH ASSESSMENT NOTE - NSBHCONSULTMEDS_PSY_A_CORE FT
-c/w home dose of Depakote 1000mg PO qdailt at bedtime -c/w home dose of Depakote 750mg daily 1000mg POqhs at bedtime  Risperidone 3mg daily  Risperidone Consta injection 50mg IM [id number]

## 2021-01-19 NOTE — BEHAVIORAL HEALTH ASSESSMENT NOTE - SUMMARY
Pt is a 61 yo male domiciled at [facility] w/ a PPHx significant for schizophrenia who presents to [facility] following transfer from OSH for management of C-spine fractures secondary to fall. Psychiatry was consulted for management of pt's outpatient psychiatric medications.    Pt w/ a PPHx of schizophrenia hospitalized for management of C-spine fractures now found to be agitated and requiring management of his psychiatric medications. Pt is a 61 yo man domiciled at [facility] w/ a PPHx significant for schizophrenia who presents to [facility] following transfer from [facility] for management of C-spine fractures secondary to fall. Psychiatry was consulted for management of pt's outpatient psychiatric medications, and his uncontrolled behavior today, yelling at his nurse and other staff.    On interview, pt was angry, agitated, and paranoid. Pt refused to respond to most questions. He stated he would only speak with his  and when told that psychiatry was going to evaluate his medications, he replied "you need to get your meds checked." Pt then began to angrily address "Jodi Robertson," stating that he does not love her anymore and that she was a person he knew who is dead and "should stay dead." Pt also insisted on taking a shower, despite being informed that he was not cleared to get out of bed secondary to his injuries.  He refused any help from staff for assistance.  Called patient's group home and spoke with both care coordination and nursing staff regarding his behavior and medications. Staff agree that his behavior has been challenging recently. He had no acute psychiatric issues and no recent thoughts of harming himself or others.  He was scheduled to receive his Risperidone Consta 50mg IM dose yesterday but did not receive it so this will be ordered today.   Pt w/ a PPHx of schizophrenia hospitalized for management of C-spine fractures now found to be agitated and requiring management of his psychiatric medications.  Haldol 5mg IM q 6hrs prn agitation  Ativan 2mg IM q6hrs prn agitation  Benadryl 25mg IM q6hrs prn (for EPS prophylaxis) Pt is a 59 yo man domiciled at [facility] w/ a PPHx significant for schizophrenia who presents to [facility] following transfer from [facility] for management of C-spine fractures secondary to fall. Psychiatry was consulted for management of pt's outpatient psychiatric medications, and his uncontrolled behavior today, yelling at his nurse and other staff.    On interview, pt was angry, agitated, and paranoid. Pt refused to respond to most questions. He stated he would only speak with his  and when told that psychiatry was going to evaluate his medications, he replied "you need to get your meds checked." Pt then began to angrily address "Jodi Robertson," stating that he does not love her anymore and that she was a person he knew who is dead and "should stay dead." Pt also insisted on taking a shower, despite being informed that he was not cleared to get out of bed secondary to his injuries.  He refused any help from staff for assistance.  Called patient's group home and spoke with both care coordination and nursing staff regarding his behavior and medications. Staff agree that his behavior has been challenging recently. He had no acute psychiatric issues and no recent thoughts of harming himself or others.  He was scheduled to receive his Risperidone Consta 50mg IM dose yesterday but did not receive it so this will be ordered today.   Pt w/ a PPHx of schizophrenia hospitalized for management of C-spine fractures now found to be agitated and requiring management of his psychiatric medications.  Please obtain ECG (would hold antipsychotic medications for QTC over 500ms)  Haldol 5mg IM q 6hrs prn agitation  Ativan 2mg IM q6hrs prn agitation  Benadryl 25mg IM q6hrs prn (for EPS prophylaxis)

## 2021-01-19 NOTE — CHART NOTE - NSCHARTNOTEFT_GEN_A_CORE
Pt was seen for psychiatric consultation and was agitated, angry, paranoid, yelling about Jodi Robertson harming him Pt was seen for psychiatric consultation and was agitated, angry, paranoid, yelling about Jodi Robertson harming him.  Pt is paranoid and demanding a  and refuses to speak with the psychiatric team arguing that they should get their own medications checked.  Pt did take his Risperidone 3mg yesterday, but missed his Depakote doses which were now ordered.  Pt started yelling and insisting that he would take a shower now though he has not yet been cleared for safety to walk.  Pt required PRNS for agitation and was ordered:  Haldol 5mg IM q6hrs prn Agitation  Ativan 2mg IM q6hrs prn Agitation  Benedryl 25mg IM q6 hrs prn EPS prevention

## 2021-01-19 NOTE — DISCHARGE NOTE PROVIDER - PROVIDER TOKENS
PROVIDER:[TOKEN:[[id number]],FOLLOWUP:[1 week]],PROVIDER:[TOKEN:[[id number]],FOLLOWUP:[2 weeks]]

## 2021-01-19 NOTE — PHYSICAL THERAPY INITIAL EVALUATION ADULT - STRENGTHENING, PT EVAL
4. GOAL: Pt will increase strength in BLE by at least 1/2 grade within 3 weeks to improve functional mobility.

## 2021-01-19 NOTE — PROGRESS NOTE ADULT - ASSESSMENT
60M PMH BPH, HTN, HLD, DM2, schizophrenia, seizures, residing at [facility] presenting after unwitnessed fall on ASA, found to have L C4-5 TP fx.     Plan:  - Pain management PRN  - c/w C-spine collar   - pending MRI/MRA per neurosurgery to rule out SC injury, although low suspicion for injury at this time  - PT evaluation today  - Tertiary survey today  - R deltoid weakness likely 2/2 chronic rotator cuff injury  - Med Rec completed and confirmed  - Regular diet  - DVT ppx: lovenox    Trauma Surgery  [id number] 60M PMH BPH, HTN, HLD, DM2, schizophrenia, seizures, residing at [facility] presenting after unwitnessed fall on ASA, found to have L C4-5 TP fx.     Plan:  - Pain management PRN  - c/w C-spine collar. Update: Patient removed c-spine collar today and is refusing to put it back on despite the risks being explained to him, to which he verbally expressed his understanding.   - pending MRI/MRA per neurosurgery to rule out SC injury, although low suspicion for injury at this time  - UPDATE: Patient will require MRA with sedation by anesthesia. Patient must have C-spine collar on prior to intubation per neurosx  - PT evaluation today  - Tertiary survey today: Update: Patient refused tertiary survey  - R deltoid weakness likely 2/2 chronic rotator cuff injury  - Med Rec completed and confirmed  - Regular diet  [facility]    Trauma Surgery  p.[phone]

## 2021-01-19 NOTE — DISCHARGE NOTE PROVIDER - NSDCMRMEDTOKEN_GEN_ALL_CORE_FT
aspirin 81 mg oral tablet: 1 tab(s) orally once a day  doxazosin 8 mg oral tablet: 1 tab(s) orally once a day  Eliquis 5 mg oral tablet: 1 tab(s) orally 2 times a day  levothyroxine 25 mcg (0.025 mg) oral tablet: 1 tab(s) orally once a day  metoprolol succinate 25 mg oral tablet, extended release: 1 tab(s) orally once a day  risperiDONE 3 mg oral tablet: 1 tab(s) orally once a day  rosuvastatin 10 mg oral tablet: 1 tab(s) orally once a day (at bedtime)   aspirin 81 mg oral tablet: 1 tab(s) orally once a day  divalproex sodium 250 mg oral delayed release tablet: 3 tab(s) orally once a day  divalproex sodium 500 mg oral delayed release tablet: 2 tab(s) orally once a day (at bedtime)  doxazosin 8 mg oral tablet: 1 tab(s) orally once a day  Eliquis 5 mg oral tablet: 1 tab(s) orally 2 times a day  levothyroxine 25 mcg (0.025 mg) oral tablet: 1 tab(s) orally once a day  metoprolol succinate 25 mg oral tablet, extended release: 1 tab(s) orally once a day  RisperDAL Consta 50 mg/2 weeks intramuscular injection, extended release:   risperiDONE 3 mg oral tablet: 1 tab(s) orally 2 times a day  rosuvastatin 10 mg oral tablet: 1 tab(s) orally once a day (at bedtime)

## 2021-01-19 NOTE — BEHAVIORAL HEALTH ASSESSMENT NOTE - HPI (INCLUDE ILLNESS QUALITY, SEVERITY, DURATION, TIMING, CONTEXT, MODIFYING FACTORS, ASSOCIATED SIGNS AND SYMPTOMS)
Pt is a 59 yo male domiciled at [facility] w/ a PPHx significant for schizophrenia who presents to [facility] following transfer from [facility] for management of C-spine fractures secondary to fall. Psychiatry was consulted for management of pt's outpatient psychiatric medications.    On interview today, pt is angry, agitated, and paranoid. Pt was minimally compliant with questioning. He states he would only speak with his  and when told that psychiatry was going to evaluate his medications, replied "you need to get your meds checked." Pt then began to angrily address "Jodi Robertson," stating that she was a person he knew who is dead and "should stay dead." Pt also insisted on taking a shower, refusing any help from staff for assistance. Pt is a 61 yo man domiciled at [facility] w/ a PPHx significant for schizophrenia who presents to [facility] following transfer from [facility] for management of C-spine fractures secondary to fall. Psychiatry was consulted for management of pt's outpatient psychiatric medications, and his uncontrolled behavior today, yelling at his nurse and other staff.    On interview today, pt is angry, agitated, and paranoid. Pt refused to respond to most questions. He states he would only speak with his  and when told that psychiatry was going to evaluate his medications, he replied "you need to get your meds checked." Pt then began to angrily address "Jodi Robertson," stating that he does not love her anymore and that she was a person he knew who is dead and "should stay dead." Pt also insisted on taking a shower, despite being informed that he was not cleared to get out of bed secondary to his injuries.  He was refusing any help from staff for assistance.  Called patient's group home and spoke with both care coordination and nursing staff regarding his behavior and medications. Staff agree that his behavior has been challenging recently. He had no acute psychiatric issues and no recent thoughts of harming himself or others.  He was scheduled to receive his Risperidone Consta 50mg IM dose yesterday but did not receive it so this will be ordered today.

## 2021-01-19 NOTE — PHYSICAL THERAPY INITIAL EVALUATION ADULT - DISCHARGE DISPOSITION, PT EVAL
TBD pending further evaluation Return to group home with assist, as well as outpatient physical therapy

## 2021-01-19 NOTE — DISCHARGE NOTE PROVIDER - CARE PROVIDER_API CALL
Daphnie Pena)  Neurosurgery  [address], [address] [place] [address]  Phone: [phone]  Fax: [phone]  Follow Up Time: 1 week    Danette Pineda)  Surgery; Surgical Critical Care  [address] [facility], [address] [place] [address]  Phone: [phone]  Fax: [phone]  Follow Up Time: 2 weeks

## 2021-01-19 NOTE — DISCHARGE NOTE PROVIDER - NSDCCPCAREPLAN_GEN_ALL_CORE_FT
PRINCIPAL DISCHARGE DIAGNOSIS  Diagnosis: Cervical spine fracture  Assessment and Plan of Treatment:       SECONDARY DISCHARGE DIAGNOSES  Diagnosis: Schizophrenia, unspecified type  Assessment and Plan of Treatment:      PRINCIPAL DISCHARGE DIAGNOSIS  Diagnosis: Cervical spine fracture  Assessment and Plan of Treatment: -Cleared to discontinue C-collar  -Follow up outpatient with Dr. Pena in 1 week      SECONDARY DISCHARGE DIAGNOSES  Diagnosis: Schizophrenia, unspecified type  Assessment and Plan of Treatment: -Continue your home medications

## 2021-01-19 NOTE — BEHAVIORAL HEALTH ASSESSMENT NOTE - NSBHCHARTREVIEWVS_PSY_A_CORE FT
Vital Signs Last 24 Hrs  T(C): 36.6 (19 Jan 2021 05:47), Max: 37 (19 Jan 2021 00:28)  T(F): 97.8 (19 Jan 2021 05:47), Max: 98.6 (19 Jan 2021 00:28)  HR: 71 (19 Jan 2021 05:47) (66 - 81)  BP: 144/73 (19 Jan 2021 05:47) (110/58 - 178/85)  BP(mean): --  RR: 18 (19 Jan 2021 05:47) (16 - 18)  SpO2: 97% (19 Jan 2021 05:47) (96% - 99%)

## 2021-01-19 NOTE — PROGRESS NOTE ADULT - ASSESSMENT
CRYSTAL GRIJALVA  60M w/ pmh of schizophrenia and lives in a group home presents after a unwitnessed fall. Patient states he tripped over his feet in the bathroom and fell onto his L shoulder. Initially was complaining of L shoulder pain. No headache, neck pain, or radiculopathy or numbness. on daily asa. At OSH , L TP fx at c4 and 5 both extending into transverse foramen. CT neg, placed in C collar. Patient is a poor historian and is very slow to respond.   Exam: aox3, FC, perrl RUE 5/5, LUE Delt 2/3, otherwise 5/5, no hoffmans, LE 5/5.   -MRI/ MRA c spine pending  -cont c collar  -R Delt likely 2/2 chronic rotator cuff injury per trauma

## 2021-01-19 NOTE — PROGRESS NOTE ADULT - SUBJECTIVE AND OBJECTIVE BOX
Trauma Surgery AM Progress Note      Injuries:  Left C4-C5 transverse process fracture      Subjective:  Pt seen and examined at bedside this AM.   No acute events overnight.  Denies chest pain, shortness of breath, dizziness, nausea, vomiting.      PMHx:  Fracture of neck, unspecified, initial encounter  HLD (hyperlipidemia)  HTN (hypertension)  Schizophrenia  Seizures  Cervical spine fracture  CERVICAL TX      Vital Signs Last 24 Hrs  T(C): 36.6 (19 Jan 2021 05:47), Max: 37 (19 Jan 2021 00:28)  T(F): 97.8 (19 Jan 2021 05:47), Max: 98.6 (19 Jan 2021 00:28)  HR: 71 (19 Jan 2021 05:47) (66 - 81)  BP: 144/73 (19 Jan 2021 05:47) (110/58 - 178/85)  BP(mean): --  RR: 18 (19 Jan 2021 05:47) (16 - 18)  SpO2: 97% (19 Jan 2021 05:47) (96% - 99%)      Physical Exam:  General Appearance:  Appears well, NAD, A&O x2  HEENT: c-spine collar in place.   Chest: Equal expansion bilaterally, equal breath sounds  CV: Pulse regular presently  Abdomen: Soft, nondistended, nontender.  Extremities:  Grossly symmetric, warm and well perfused 4x. mild left shoulder pain.        I&O's Summary  18 Jan 2021 07:01  -  19 Jan 2021 07:00  --------------------------------------------------------  IN: 240 mL / OUT: 0 mL / NET: 240 mL         LABs:                12.3   7.59  )-----------( 303      ( 19 Jan 2021 06:49 )             37.7     01-19    136  |  102  |  13  ----------------------------<  94  4.2   |  25  |  0.88    Ca    9.3      19 Jan 2021 06:42  Phos  3.4     01-19  Mg     1.9     01-19    TPro  6.3  /  Alb  3.4  /  TBili  0.4  /  DBili  x   /  AST  11  /  ALT  12  /  AlkPhos  103  01-18    PT/INR - ( 18 Jan 2021 04:36 )   PT: 13.6 sec;   INR: 1.14 ratio         PTT - ( 18 Jan 2021 04:36 )  PTT:36.3 sec      RADIOLOGY & ADDITIONAL STUDIES:

## 2021-01-19 NOTE — BEHAVIORAL HEALTH ASSESSMENT NOTE - CASE SUMMARY
Pt is a 59 yo man domiciled at [facility] w/ a PPHx significant for schizophrenia who presents to [facility] following transfer from [facility] for management of C-spine fractures secondary to fall. Psychiatry was consulted for management of pt's outpatient psychiatric medications, and his uncontrolled behavior today, yelling at his nurse and other staff.  Called patient's group home and spoke with both care coordination and nursing staff regarding his behavior and medications. Staff agree that his behavior has been challenging recently. He had no acute psychiatric issues and no recent thoughts of harming himself or others.  He was scheduled to receive his Risperidone Consta 50mg IM dose yesterday but did not receive it so this will be ordered today.   Pt will also continue his daily Risperidone and Risperidone Consta injection 50mg IM [id number] ordered today

## 2021-01-19 NOTE — PROGRESS NOTE ADULT - ATTENDING COMMENTS
Patient seen and examined and agree with above.   Patient has increased agitation today and refusing many things such as taking his medication as well as speaking with us on rounds.   I discussed obtaining a CT scan this morning and patient refused to go and I discussed that we need to rule out vascular injury in order to be cleared for return to his group home. The patient says he doesn't need to leave and can stay here in the hospital a long time. I do not believe the patient is understanding what is being explained to him due to his underlying schizophrenia vs schizoaffective disorder. Psychiatry evaluated the patient and will follow recommendations of the medications suggested. Once patient undergoes CTA to rule out vascular injury secondary to cervical spine fractures he would be cleared for discharge to his group home.  Hemodynamically stable at this time.

## 2021-01-19 NOTE — DISCHARGE NOTE PROVIDER - NSDCFUADDAPPT_GEN_ALL_CORE_FT
Please make an appointment and follow up outpatient with Dr. Pineda in 2 weeks    Please make an appointment and follow up outpatient with Dr. Pena (Neurosurgery) in 1 week  Please make an appointment and follow up with your Primary Care Physician in 1-2 weeks

## 2021-01-19 NOTE — BEHAVIORAL HEALTH ASSESSMENT NOTE - NSBHCONSULTFOLLOWAFTERCARE_PSY_A_CORE FT
Pt will return to his Group home once he is medically cleared to resume his outpt psychiatric care and nursing care there.

## 2021-01-20 LAB — GLUCOSE BLDC GLUCOMTR-MCNC: 105 MG/DL — HIGH (ref 70–99)

## 2021-01-20 PROCEDURE — 99232 SBSQ HOSP IP/OBS MODERATE 35: CPT

## 2021-01-20 PROCEDURE — 70548 MR ANGIOGRAPHY NECK W/DYE: CPT | Mod: 26

## 2021-01-20 PROCEDURE — 72141 MRI NECK SPINE W/O DYE: CPT | Mod: 26

## 2021-01-20 PROCEDURE — 70545 MR ANGIOGRAPHY HEAD W/DYE: CPT | Mod: 26

## 2021-01-20 RX ORDER — RISPERIDONE 4 MG/1
3 TABLET ORAL
Refills: 0 | Status: DISCONTINUED | OUTPATIENT
Start: 2021-01-20 | End: 2021-01-22

## 2021-01-20 RX ORDER — INSULIN LISPRO 100/ML
VIAL (ML) SUBCUTANEOUS
Refills: 0 | Status: DISCONTINUED | OUTPATIENT
Start: 2021-01-20 | End: 2021-01-22

## 2021-01-20 RX ORDER — INSULIN LISPRO 100/ML
VIAL (ML) SUBCUTANEOUS AT BEDTIME
Refills: 0 | Status: DISCONTINUED | OUTPATIENT
Start: 2021-01-20 | End: 2021-01-22

## 2021-01-20 RX ORDER — SODIUM CHLORIDE 9 MG/ML
1000 INJECTION, SOLUTION INTRAVENOUS
Refills: 0 | Status: DISCONTINUED | OUTPATIENT
Start: 2021-01-20 | End: 2021-01-22

## 2021-01-20 RX ADMIN — Medication 2 MILLIGRAM(S): at 22:22

## 2021-01-20 RX ADMIN — RISPERIDONE 3 MILLIGRAM(S): 4 TABLET ORAL at 21:30

## 2021-01-20 RX ADMIN — HALOPERIDOL DECANOATE 5 MILLIGRAM(S): 100 INJECTION INTRAMUSCULAR at 22:23

## 2021-01-20 RX ADMIN — Medication 25 MILLIGRAM(S): at 22:23

## 2021-01-20 RX ADMIN — Medication 25 MILLIGRAM(S): at 05:47

## 2021-01-20 RX ADMIN — Medication 2 MILLIGRAM(S): at 11:11

## 2021-01-20 RX ADMIN — DIVALPROEX SODIUM 750 MILLIGRAM(S): 500 TABLET, DELAYED RELEASE ORAL at 11:12

## 2021-01-20 RX ADMIN — HALOPERIDOL DECANOATE 5 MILLIGRAM(S): 100 INJECTION INTRAMUSCULAR at 11:10

## 2021-01-20 RX ADMIN — RISPERIDONE 3 MILLIGRAM(S): 4 TABLET ORAL at 11:12

## 2021-01-20 RX ADMIN — Medication 25 MILLIGRAM(S): at 11:09

## 2021-01-20 RX ADMIN — Medication 8 MILLIGRAM(S): at 21:30

## 2021-01-20 RX ADMIN — ATORVASTATIN CALCIUM 40 MILLIGRAM(S): 80 TABLET, FILM COATED ORAL at 21:30

## 2021-01-20 RX ADMIN — DIVALPROEX SODIUM 1000 MILLIGRAM(S): 500 TABLET, DELAYED RELEASE ORAL at 21:30

## 2021-01-20 RX ADMIN — Medication 25 MICROGRAM(S): at 05:47

## 2021-01-20 NOTE — PROGRESS NOTE ADULT - SUBJECTIVE AND OBJECTIVE BOX
Patient seen and examined at bedside.    --Anticoagulation--  enoxaparin Injectable 40 milliGRAM(s) SubCutaneous daily    T(C): 36.6 (01-20-21 @ 05:46), Max: 36.6 (01-19-21 @ 21:26)  HR: 62 (01-20-21 @ 05:46) (61 - 62)  BP: 146/81 (01-20-21 @ 05:46) (132/83 - 146/81)  RR: 18 (01-20-21 @ 05:46) (18 - 18)  SpO2: 95% (01-20-21 @ 05:46) (95% - 95%)  Wt(kg): --    Exam:  aox3, FC, perrl RUE 5/5, LUE Delt 2/3, otherwise 5/5, no hoffmans, LE 5/5.

## 2021-01-20 NOTE — PROGRESS NOTE BEHAVIORAL HEALTH - NSBHCHARTREVIEWLAB_PSY_A_CORE FT
12.3   7.59  )-----------( 303      ( 19 Jan 2021 06:49 )             37.7   01-19    136  |  102  |  13  ----------------------------<  94  4.2   |  25  |  0.88    Ca    9.3      19 Jan 2021 06:42  Phos  3.4     01-19  Mg     1.9     01-19

## 2021-01-20 NOTE — CHART NOTE - NSCHARTNOTEFT_GEN_A_CORE
MRA neck and MR c-spine reviewed  No vert injury on MRA  Trace L 4,5,6 facet edema, otherwise no ligamentous injury, can D/C C-collar, outpatient f/u w/ Dr. Pena

## 2021-01-20 NOTE — PROGRESS NOTE ADULT - SUBJECTIVE AND OBJECTIVE BOX
ACS Progress Note    Injuries: Left C4-C5 transverse process fracture      S: Pt seen and examined at bedside this AM.  No acute events overnight.  c-spine collar not in place.  (replaced by residents)  Did not speak on AM rounds.     O:  Vital Signs Last 24 Hrs  T(C): 36.5 (20 Jan 2021 08:22), Max: 36.6 (19 Jan 2021 21:26)  T(F): 97.7 (20 Jan 2021 08:22), Max: 97.9 (20 Jan 2021 05:46)  HR: 59 (20 Jan 2021 08:22) (59 - 62)  BP: 151/82 (20 Jan 2021 08:22) (132/83 - 151/82)  RR: 18 (20 Jan 2021 08:22) (18 - 18)  SpO2: 95% (20 Jan 2021 08:22) (95% - 95%)    I&O's Detail    19 Jan 2021 07:01  -  20 Jan 2021 07:00  --------------------------------------------------------  IN:    Oral Fluid: 480 mL  Total IN: 480 mL    OUT:  Total OUT: 0 mL    Total NET: 480 mL        MEDICATIONS  (STANDING):  atorvastatin 40 milliGRAM(s) Oral at bedtime  dextrose 5%. 1000 milliLiter(s) (100 mL/Hr) IV Continuous <Continuous>  dextrose 50% Injectable 12.5 Gram(s) IV Push once  diVALproex DR 1000 milliGRAM(s) Oral at bedtime  diVALproex  milliGRAM(s) Oral daily  doxazosin 8 milliGRAM(s) Oral at bedtime  enoxaparin Injectable 40 milliGRAM(s) SubCutaneous daily  levothyroxine 25 MICROGram(s) Oral daily  metoprolol succinate ER 25 milliGRAM(s) Oral daily  risperiDONE   Tablet 3 milliGRAM(s) Oral daily    MEDICATIONS  (PRN):  diphenhydrAMINE   Injectable 25 milliGRAM(s) IntraMuscular every 6 hours PRN Agitation/EPS/Rash and/or Itching  haloperidol    Injectable 5 milliGRAM(s) IntraMuscular every 6 hours PRN Agitation  LORazepam   Injectable 2 milliGRAM(s) IntraMuscular every 6 hours PRN Agitation                            12.3   7.59  )-----------( 303      ( 19 Jan 2021 06:49 )             37.7       01-19    136  |  102  |  13  ----------------------------<  94  4.2   |  25  |  0.88    Ca    9.3      19 Jan 2021 06:42  Phos  3.4     01-19  Mg     1.9     01-19      Physical Exam:  General Appearance:  Appears well, NAD, A&O x2  HEENT: c-spine collar not in place.   (replaced by residents)  Chest: Equal expansion bilaterally, equal breath sounds  CV: Pulse regular presently  Abdomen: Soft, nondistended, nontender.  Extremities:  Grossly symmetric, warm and well perfused 4x. mild left shoulder pain.    Lines:  IV: patent, in place.

## 2021-01-20 NOTE — PROGRESS NOTE ADULT - ASSESSMENT
CRYSTAL GRIJALVA  60M w/ pmh of schizophrenia and lives in a group home presents after a unwitnessed fall. Patient states he tripped over his feet in the bathroom and fell onto his L shoulder. Initially was complaining of L shoulder pain. No headache, neck pain, or radiculopathy or numbness. on daily asa. At OSH , L TP fx at c4 and 5 both extending into transverse foramen. CT neg, placed in C collar. Patient is a poor historian and is very slow to respond.   Exam: aox3, FC, perrl RUE 5/5, LUE Delt 2/3, otherwise 5/5, no hoffmans, LE 5/5.   -MRI/ MRA c spine pending, will need with anesthesia  -cont c collar, pt refusing to wear  -Psych consulted  -R Delt likely 2/2 chronic rotator cuff injury per trauma

## 2021-01-20 NOTE — PROGRESS NOTE ADULT - ASSESSMENT
60M PMH BPH, HTN, HLD, DM2, schizophrenia, seizures, residing at [facility] presenting after unwitnessed fall on ASA, found to have L C4-5 TP fx.     Plan:  - Pain management PRN  - c/w C-spine collar. Update: Patient keeps removing c-spine collar and refusing to put it back on despite the risks being explained to him, to which he verbally expressed his understanding.   - pending MRI/MRA per neurosurgery to rule out SC injury, although low suspicion for injury at this time.  Patient requires MRA with sedation. Patient must have C-spine collar on prior to intubation  per neurosx  - PT evaluation  - Appreciate Pysch Consult: no psychiatric contraindications to discharge  - Tertiary survey: Update: Patient refused tertiary survey  - R deltoid weakness likely 2/2 chronic rotator cuff injury  - Med Rec completed and confirmed  - Regular diet  - DVT ppx: lovenox      Trauma  p. [phone]   60M PMH BPH, HTN, HLD, DM2, schizophrenia, seizures, residing at [facility] presenting after unwitnessed fall on ASA, found to have L C4-5 TP fx.     Plan:  - Pain management PRN  - c/w C-spine collar. Update: Patient keeps removing c-spine collar and refusing to put it back on despite the risks being explained to him, to which he verbally expressed his understanding.   - pending MRI/MRA per neurosurgery to rule out SC injury, although low suspicion for injury at this time.  Patient requires MRA with sedation. Patient must have C-spine collar on prior to intubation  per neurosx  - PT evaluation  - CT Angio to rule out vessel injury.   - Appreciate Pysch Consult: no psychiatric contraindications to discharge  - Tertiary survey: Update: Patient refused tertiary survey  - R deltoid weakness likely 2/2 chronic rotator cuff injury  - Med Rec completed and confirmed  - Regular diet  - DVT ppx: lovenox      Trauma  p. [phone]

## 2021-01-21 LAB
GLUCOSE BLDC GLUCOMTR-MCNC: 78 MG/DL — SIGNIFICANT CHANGE UP (ref 70–99)
GLUCOSE BLDC GLUCOMTR-MCNC: 80 MG/DL — SIGNIFICANT CHANGE UP (ref 70–99)
SARS-COV-2 RNA SPEC QL NAA+PROBE: SIGNIFICANT CHANGE UP

## 2021-01-21 PROCEDURE — 99232 SBSQ HOSP IP/OBS MODERATE 35: CPT

## 2021-01-21 RX ORDER — DIVALPROEX SODIUM 500 MG/1
2 TABLET, DELAYED RELEASE ORAL
Qty: 0 | Refills: 0 | DISCHARGE
Start: 2021-01-21

## 2021-01-21 RX ORDER — HALOPERIDOL DECANOATE 100 MG/ML
5 INJECTION INTRAMUSCULAR EVERY 6 HOURS
Refills: 0 | Status: DISCONTINUED | OUTPATIENT
Start: 2021-01-21 | End: 2021-01-22

## 2021-01-21 RX ORDER — RISPERIDONE 4 MG/1
1 TABLET ORAL
Qty: 0 | Refills: 0 | DISCHARGE

## 2021-01-21 RX ORDER — CLONAZEPAM 1 MG
0.5 TABLET ORAL THREE TIMES A DAY
Refills: 0 | Status: DISCONTINUED | OUTPATIENT
Start: 2021-01-21 | End: 2021-01-22

## 2021-01-21 RX ORDER — DIPHENHYDRAMINE HCL 50 MG
50 CAPSULE ORAL EVERY 6 HOURS
Refills: 0 | Status: DISCONTINUED | OUTPATIENT
Start: 2021-01-21 | End: 2021-01-22

## 2021-01-21 RX ORDER — DIVALPROEX SODIUM 500 MG/1
3 TABLET, DELAYED RELEASE ORAL
Qty: 0 | Refills: 0 | DISCHARGE
Start: 2021-01-21

## 2021-01-21 RX ADMIN — DIVALPROEX SODIUM 750 MILLIGRAM(S): 500 TABLET, DELAYED RELEASE ORAL at 11:36

## 2021-01-21 RX ADMIN — Medication 2 MILLIGRAM(S): at 20:45

## 2021-01-21 RX ADMIN — Medication 25 MICROGRAM(S): at 09:38

## 2021-01-21 RX ADMIN — Medication 25 MILLIGRAM(S): at 09:38

## 2021-01-21 RX ADMIN — Medication 0.5 MILLIGRAM(S): at 12:31

## 2021-01-21 RX ADMIN — HALOPERIDOL DECANOATE 5 MILLIGRAM(S): 100 INJECTION INTRAMUSCULAR at 13:10

## 2021-01-21 RX ADMIN — Medication 25 MILLIGRAM(S): at 20:45

## 2021-01-21 RX ADMIN — Medication 0.5 MILLIGRAM(S): at 20:39

## 2021-01-21 RX ADMIN — RISPERIDONE 3 MILLIGRAM(S): 4 TABLET ORAL at 11:35

## 2021-01-21 RX ADMIN — DIVALPROEX SODIUM 1000 MILLIGRAM(S): 500 TABLET, DELAYED RELEASE ORAL at 20:36

## 2021-01-21 RX ADMIN — ATORVASTATIN CALCIUM 40 MILLIGRAM(S): 80 TABLET, FILM COATED ORAL at 20:36

## 2021-01-21 RX ADMIN — HALOPERIDOL DECANOATE 5 MILLIGRAM(S): 100 INJECTION INTRAMUSCULAR at 20:45

## 2021-01-21 RX ADMIN — Medication 50 MILLIGRAM(S): at 12:31

## 2021-01-21 RX ADMIN — Medication 8 MILLIGRAM(S): at 20:37

## 2021-01-21 RX ADMIN — Medication 2 MILLIGRAM(S): at 12:31

## 2021-01-21 NOTE — PROVIDER CONTACT NOTE (OTHER) - ACTION/TREATMENT ORDERED:
Provider to call psych about consult/ ivpb meds dc'd
As per Flo VILLAR, allow pt time to calm down. Will attempt to give IM medications.
No orders at this time/ continue prn meds of pt cooperates complete orders
Provider came to bedside to assess/educate patient. Pt still refusing to wear collar

## 2021-01-21 NOTE — PROVIDER CONTACT NOTE (OTHER) - ASSESSMENT
Pt not allowing assessments. No signs of immediate distress noted
Pt refusing all assessments no immediate signs of distress noted
Unable to obtain VS. No immediate signs of distress noted
Pt combative getting OOB and closing room door. Pt demanding room lights to be off at this time and demanding RN to leave the room. Pt continues to also refuse medications. Pt states "get out!! I know what you are evil! You are the devil! Get away from me you devil! Pt attempting to close door in RN face. Calming measures promoted. Pt sitting at edge of bed. Pt refusing to be medicated. States "I have a right to refuse here!"

## 2021-01-21 NOTE — PROVIDER CONTACT NOTE (OTHER) - BACKGROUND
Pt admitted sp fall with cervical spine fx
Pt has hx schizophrenia. Admitted w/ cervical spine fx
Pt admitted with cervical spine fracture
s/p c-spine fx

## 2021-01-21 NOTE — PROVIDER CONTACT NOTE (OTHER) - REASON
Pt combative getting OOB and closing room door
Pt refusing care/ uncooperative
Patient status update
Refusing Cervical Collar

## 2021-01-21 NOTE — PROVIDER CONTACT NOTE (OTHER) - SITUATION
Pt refusing to wear collar. Pt educated but continues to refuse
Pt combative getting OOB and closing room door
Pt pulled out iv refusing all meds/ fingerstick/ care.
Patient refusing new iv placement/ ECG/ fingersticks, vital signs, cervical collar

## 2021-01-21 NOTE — PROGRESS NOTE ADULT - SUBJECTIVE AND OBJECTIVE BOX
ACS Progress Note    Injuries: Left C4-C5 transverse process fracture      S: Pt seen and examined at bedside this AM.  No acute events overnight.  c-spine collar not in place.  Did not speak on AM rounds.     O:  Vital Signs Last 24 Hrs  T(C): 36.6 (21 Jan 2021 09:20), Max: 36.6 (20 Jan 2021 21:09)  T(F): 97.8 (21 Jan 2021 09:20), Max: 97.9 (20 Jan 2021 21:09)  HR: 67 (21 Jan 2021 09:20) (58 - 67)  BP: 147/84 (21 Jan 2021 09:20) (130/79 - 163/63)  BP(mean): 99 (20 Jan 2021 15:30) (90 - 100)  RR: 16 (21 Jan 2021 09:20) (12 - 16)  SpO2: 98% (21 Jan 2021 09:20) (97% - 100%)      Physical Exam:  General Appearance:  Appears well, NAD, A&O x2  HEENT: c-spine collar not in place.   (replaced by residents)  Chest: Equal expansion bilaterally, equal breath sounds  CV: Pulse regular presently  Abdomen: Soft, nondistended, nontender.  Extremities:  Grossly symmetric, warm and well perfused 4x. mild left shoulder pain.      I&O's Detail    20 Jan 2021 07:01  -  21 Jan 2021 07:00  --------------------------------------------------------  IN:    Lactated Ringers: 750 mL    Oral Fluid: 300 mL  Total IN: 1050 mL    OUT:    Voided (mL): 200 mL  Total OUT: 200 mL    Total NET: 850 mL      MEDICATIONS  (STANDING):  atorvastatin 40 milliGRAM(s) Oral at bedtime  dextrose 5%. 1000 milliLiter(s) (100 mL/Hr) IV Continuous <Continuous>  dextrose 50% Injectable 12.5 Gram(s) IV Push once  diVALproex DR 1000 milliGRAM(s) Oral at bedtime  diVALproex  milliGRAM(s) Oral daily  doxazosin 8 milliGRAM(s) Oral at bedtime  enoxaparin Injectable 40 milliGRAM(s) SubCutaneous daily  levothyroxine 25 MICROGram(s) Oral daily  metoprolol succinate ER 25 milliGRAM(s) Oral daily  risperiDONE   Tablet 3 milliGRAM(s) Oral daily    MEDICATIONS  (PRN):  diphenhydrAMINE   Injectable 25 milliGRAM(s) IntraMuscular every 6 hours PRN Agitation/EPS/Rash and/or Itching  haloperidol    Injectable 5 milliGRAM(s) IntraMuscular every 6 hours PRN Agitation  LORazepam   Injectable 2 milliGRAM(s) IntraMuscular every 6 hours PRN Agitation    < from: MR Angio Head w/ IV Cont (01.20.21 @ 14:54) >  EXAM:  MR ANGIO NECK IC                          EXAM:  MR ANGIO BRAIN IC                            PROCEDURE DATE:  01/20/2021            INTERPRETATION:  CLINICAL INDICATION: Rule out vascular injury. Left C4-C5 transverse process fracture.    TECHNIQUE: MRA of the head using post contrast technique was performed without intravenous contrast. MRA of the neck using post contrast technique.    COMPARISON: None available.    FINDINGS:    MRA NECK:  Standard configuration of the aortic arch withno gross ostial stenosis of the great vessels.    The common carotid arteries are patent bilaterally without evidence of stenosis. Atherosclerotic plaque at the carotid bifurcations without stenosis. There is no narrowing of the cervical internal carotid arteries bilaterally.    The vertebral arteries are patent bilaterally throughout their course, including at the C4-C5 levels.    MRA HEAD:  Atherosclerotic changes of the bilateral intracranial internal carotid arteries with mild to moderate stenoses on the right.  Mild stenosis of the proximal right PCA. Remainder of the proximal anterior, middle and posterior cerebral arteries are patent bilaterally.  Normal vertebrobasilar system.    No evidence of aneurysm or vascular malformation.    IMPRESSION:  MRA neck:  -No traumatic vascular injury including at C4-C5 levels.    MRA head:  -Atherosclerotic changes with mild to moderate stenosis of the right intracranial ICA and mild stenosis of the right PCA.    < end of copied text >  < from: MR Cervical Spine No Cont (01.20.21 @ 14:53) >  EXAM:  MR SPINE CERVICAL                            PROCEDURE DATE:  01/20/2021            INTERPRETATION:  CLINICAL INDICATION: C-spine fracture. Rule out ligamentous injury.    TECHNIQUE: Multiplanar multisequence MR imaging of the cervical spinewas performed without the administration of intravenous contrast, according to standard protocol.    COMPARISON: None available.    FINDINGS:    ALIGNMENT:  The alignment is normal.    VERTEBRAE: There is mild edema involving the left C4, C5, and C6 facets, likely corresponding to the fracture described on outside study. There is no subluxation or dislocation identified. No epidural collection is identified.    DISCS: Multilevel endplate degenerative changes. Multilevel facet joint arthrosis mostsevere on the left.    CORD: There is no intrinsic spinal cord signal abnormality.    PARAVERTEBRAL SOFT TISSUES: The visualized paravertebral soft tissues appear within normal limits.    EVALUATION OF INDIVIDUAL LEVELS:  C2-3: No disc herniation, spinal canal or right neuroforaminal stenosis. Mild left neuroforaminal stenosis.    C3-4: Disc osteophyte complex ventrally indenting the left cord. Severe left neuroforaminal stenosis. Mild spinal canal stenosis..    C4-5: Disc osteophyte complex and severe bilateral facet joint arthrosis. There is severe spinal canal stenosis. At least moderate right and severe left neuroforaminal stenosis.    C5-6: Disc osteophyte complex. Left worse than right facet joint arthrosis. There is severe spinal canalstenosis. At least mild bilateral neuroforaminal stenosis.    C6-7: There is disc bulge with ventral thecal indentation. There is moderate spinal canal stenosis. No neuroforaminal stenosis.    C7-T1: No disc herniation, spinal canal or neuroforaminalstenosis.    IMPRESSION:    Mild facet edema involving the left C4, C5, and C6 facets likely corresponding with fracture reported on outside study. No dislocation/subluxation. No epidural collection.    Multilevel degenerative changes resulting in severe spinal canal stenosis at C4-C5 and C5-C6. Correlate with    < end of copied text >   ACS Progress Note    Injuries: Left C4-C5 transverse process fracture      S: Pt seen and examined at bedside this AM.  No acute events overnight.  Did not speak on AM rounds.     O:  Vital Signs Last 24 Hrs  T(C): 36.6 (21 Jan 2021 09:20), Max: 36.6 (20 Jan 2021 21:09)  T(F): 97.8 (21 Jan 2021 09:20), Max: 97.9 (20 Jan 2021 21:09)  HR: 67 (21 Jan 2021 09:20) (58 - 67)  BP: 147/84 (21 Jan 2021 09:20) (130/79 - 163/63)  BP(mean): 99 (20 Jan 2021 15:30) (90 - 100)  RR: 16 (21 Jan 2021 09:20) (12 - 16)  SpO2: 98% (21 Jan 2021 09:20) (97% - 100%)      Physical Exam:  General Appearance:  Appears well, NAD, A&O x2  HEENT: c-spine collar not in place.   (replaced by residents)  Chest: Equal expansion bilaterally, equal breath sounds  CV: Pulse regular presently  Abdomen: Soft, nondistended, nontender.  Extremities:  Grossly symmetric, warm and well perfused 4x. mild left shoulder pain.      I&O's Detail    20 Jan 2021 07:01  -  21 Jan 2021 07:00  --------------------------------------------------------  IN:    Lactated Ringers: 750 mL    Oral Fluid: 300 mL  Total IN: 1050 mL    OUT:    Voided (mL): 200 mL  Total OUT: 200 mL    Total NET: 850 mL      MEDICATIONS  (STANDING):  atorvastatin 40 milliGRAM(s) Oral at bedtime  dextrose 5%. 1000 milliLiter(s) (100 mL/Hr) IV Continuous <Continuous>  dextrose 50% Injectable 12.5 Gram(s) IV Push once  diVALproex DR 1000 milliGRAM(s) Oral at bedtime  diVALproex  milliGRAM(s) Oral daily  doxazosin 8 milliGRAM(s) Oral at bedtime  enoxaparin Injectable 40 milliGRAM(s) SubCutaneous daily  levothyroxine 25 MICROGram(s) Oral daily  metoprolol succinate ER 25 milliGRAM(s) Oral daily  risperiDONE   Tablet 3 milliGRAM(s) Oral daily    MEDICATIONS  (PRN):  diphenhydrAMINE   Injectable 25 milliGRAM(s) IntraMuscular every 6 hours PRN Agitation/EPS/Rash and/or Itching  haloperidol    Injectable 5 milliGRAM(s) IntraMuscular every 6 hours PRN Agitation  LORazepam   Injectable 2 milliGRAM(s) IntraMuscular every 6 hours PRN Agitation    < from: MR Angio Head w/ IV Cont (01.20.21 @ 14:54) >  EXAM:  MR ANGIO NECK IC                          EXAM:  MR ANGIO BRAIN IC                            PROCEDURE DATE:  01/20/2021            INTERPRETATION:  CLINICAL INDICATION: Rule out vascular injury. Left C4-C5 transverse process fracture.    TECHNIQUE: MRA of the head using post contrast technique was performed without intravenous contrast. MRA of the neck using post contrast technique.    COMPARISON: None available.    FINDINGS:    MRA NECK:  Standard configuration of the aortic arch withno gross ostial stenosis of the great vessels.    The common carotid arteries are patent bilaterally without evidence of stenosis. Atherosclerotic plaque at the carotid bifurcations without stenosis. There is no narrowing of the cervical internal carotid arteries bilaterally.    The vertebral arteries are patent bilaterally throughout their course, including at the C4-C5 levels.    MRA HEAD:  Atherosclerotic changes of the bilateral intracranial internal carotid arteries with mild to moderate stenoses on the right.  Mild stenosis of the proximal right PCA. Remainder of the proximal anterior, middle and posterior cerebral arteries are patent bilaterally.  Normal vertebrobasilar system.    No evidence of aneurysm or vascular malformation.    IMPRESSION:  MRA neck:  -No traumatic vascular injury including at C4-C5 levels.    MRA head:  -Atherosclerotic changes with mild to moderate stenosis of the right intracranial ICA and mild stenosis of the right PCA.    < end of copied text >  < from: MR Cervical Spine No Cont (01.20.21 @ 14:53) >  EXAM:  MR SPINE CERVICAL                            PROCEDURE DATE:  01/20/2021            INTERPRETATION:  CLINICAL INDICATION: C-spine fracture. Rule out ligamentous injury.    TECHNIQUE: Multiplanar multisequence MR imaging of the cervical spinewas performed without the administration of intravenous contrast, according to standard protocol.    COMPARISON: None available.    FINDINGS:    ALIGNMENT:  The alignment is normal.    VERTEBRAE: There is mild edema involving the left C4, C5, and C6 facets, likely corresponding to the fracture described on outside study. There is no subluxation or dislocation identified. No epidural collection is identified.    DISCS: Multilevel endplate degenerative changes. Multilevel facet joint arthrosis mostsevere on the left.    CORD: There is no intrinsic spinal cord signal abnormality.    PARAVERTEBRAL SOFT TISSUES: The visualized paravertebral soft tissues appear within normal limits.    EVALUATION OF INDIVIDUAL LEVELS:  C2-3: No disc herniation, spinal canal or right neuroforaminal stenosis. Mild left neuroforaminal stenosis.    C3-4: Disc osteophyte complex ventrally indenting the left cord. Severe left neuroforaminal stenosis. Mild spinal canal stenosis..    C4-5: Disc osteophyte complex and severe bilateral facet joint arthrosis. There is severe spinal canal stenosis. At least moderate right and severe left neuroforaminal stenosis.    C5-6: Disc osteophyte complex. Left worse than right facet joint arthrosis. There is severe spinal canalstenosis. At least mild bilateral neuroforaminal stenosis.    C6-7: There is disc bulge with ventral thecal indentation. There is moderate spinal canal stenosis. No neuroforaminal stenosis.    C7-T1: No disc herniation, spinal canal or neuroforaminalstenosis.    IMPRESSION:    Mild facet edema involving the left C4, C5, and C6 facets likely corresponding with fracture reported on outside study. No dislocation/subluxation. No epidural collection.    Multilevel degenerative changes resulting in severe spinal canal stenosis at C4-C5 and C5-C6. Correlate with    < end of copied text >

## 2021-01-21 NOTE — PROGRESS NOTE ADULT - ASSESSMENT
60M PMH BPH, HTN, HLD, DM2, schizophrenia, seizures, residing at [facility] presenting after unwitnessed fall on ASA, found to have L C4-5 TP fx.     Plan:  - Pain management PRN  - c/w C-spine collar. Update: Patient keeps removing c-spine collar and refusing to put it back on despite the risks being explained to him, to which he verbally expressed his understanding.   - pending MRI/MRA per neurosurgery to rule out SC injury, although low suspicion for injury at this time.  Patient requires MRA with sedation. Patient must have C-spine collar on prior to intubation  per neurosx  - PT evaluation  - CT Angio to rule out vessel injury.   - Appreciate [facility] Consult: no psychiatric contraindications to discharge  - Tertiary survey: Update: Patient refused tertiary survey  - R deltoid weakness likely 2/2 chronic rotator cuff injury  - Med Rec completed and confirmed  - Regular diet  - DVT ppx: lovenox      Trauma  p. [phone]   60M PMH BPH, HTN, HLD, DM2, schizophrenia, seizures[facility]itnessed fall on ASA, found to have L C4-5 TP fx.     Plan:  - Pain management PRN  - Per NSx, can D/C C-collar, outpatient f/u w/ Dr. Pena  - PT evaluation  - Appreciate [facility] Consult: no psychiatric contraindications to discharge  - Tertiary survey: Update: Patient refused tertiary survey  - R deltoid weakness likely 2/2 chronic rotator cuff injury  - Med Rec completed and confirmed  - Regular diet  - DVT ppx: lovenox  - DC today, return to group home      Trauma  p. [phone]

## 2021-01-22 VITALS
HEART RATE: 55 BPM | DIASTOLIC BLOOD PRESSURE: 74 MMHG | RESPIRATION RATE: 16 BRPM | OXYGEN SATURATION: 98 % | TEMPERATURE: 98 F | SYSTOLIC BLOOD PRESSURE: 155 MMHG

## 2021-01-22 LAB
GLUCOSE BLDC GLUCOMTR-MCNC: 73 MG/DL — SIGNIFICANT CHANGE UP (ref 70–99)
GLUCOSE BLDC GLUCOMTR-MCNC: 73 MG/DL — SIGNIFICANT CHANGE UP (ref 70–99)
GLUCOSE BLDC GLUCOMTR-MCNC: 80 MG/DL — SIGNIFICANT CHANGE UP (ref 70–99)

## 2021-01-22 PROCEDURE — 72141 MRI NECK SPINE W/O DYE: CPT

## 2021-01-22 PROCEDURE — 82803 BLOOD GASES ANY COMBINATION: CPT

## 2021-01-22 PROCEDURE — 85730 THROMBOPLASTIN TIME PARTIAL: CPT

## 2021-01-22 PROCEDURE — 84100 ASSAY OF PHOSPHORUS: CPT

## 2021-01-22 PROCEDURE — 82330 ASSAY OF CALCIUM: CPT

## 2021-01-22 PROCEDURE — 93005 ELECTROCARDIOGRAM TRACING: CPT

## 2021-01-22 PROCEDURE — 99232 SBSQ HOSP IP/OBS MODERATE 35: CPT

## 2021-01-22 PROCEDURE — 84295 ASSAY OF SERUM SODIUM: CPT

## 2021-01-22 PROCEDURE — 99285 EMERGENCY DEPT VISIT HI MDM: CPT

## 2021-01-22 PROCEDURE — 83605 ASSAY OF LACTIC ACID: CPT

## 2021-01-22 PROCEDURE — 82435 ASSAY OF BLOOD CHLORIDE: CPT

## 2021-01-22 PROCEDURE — 70545 MR ANGIOGRAPHY HEAD W/DYE: CPT

## 2021-01-22 PROCEDURE — 85018 HEMOGLOBIN: CPT

## 2021-01-22 PROCEDURE — 80048 BASIC METABOLIC PNL TOTAL CA: CPT

## 2021-01-22 PROCEDURE — 83735 ASSAY OF MAGNESIUM: CPT

## 2021-01-22 PROCEDURE — 84132 ASSAY OF SERUM POTASSIUM: CPT

## 2021-01-22 PROCEDURE — 82947 ASSAY GLUCOSE BLOOD QUANT: CPT

## 2021-01-22 PROCEDURE — 97161 PT EVAL LOW COMPLEX 20 MIN: CPT

## 2021-01-22 PROCEDURE — 70548 MR ANGIOGRAPHY NECK W/DYE: CPT

## 2021-01-22 PROCEDURE — U0005: CPT

## 2021-01-22 PROCEDURE — 80053 COMPREHEN METABOLIC PANEL: CPT

## 2021-01-22 PROCEDURE — 85025 COMPLETE CBC W/AUTO DIFF WBC: CPT

## 2021-01-22 PROCEDURE — 82962 GLUCOSE BLOOD TEST: CPT

## 2021-01-22 PROCEDURE — 86901 BLOOD TYPING SEROLOGIC RH(D): CPT

## 2021-01-22 PROCEDURE — 86850 RBC ANTIBODY SCREEN: CPT

## 2021-01-22 PROCEDURE — 85014 HEMATOCRIT: CPT

## 2021-01-22 PROCEDURE — 83036 HEMOGLOBIN GLYCOSYLATED A1C: CPT

## 2021-01-22 PROCEDURE — 86900 BLOOD TYPING SEROLOGIC ABO: CPT

## 2021-01-22 PROCEDURE — 85610 PROTHROMBIN TIME: CPT

## 2021-01-22 PROCEDURE — U0003: CPT

## 2021-01-22 PROCEDURE — 85027 COMPLETE CBC AUTOMATED: CPT

## 2021-01-22 RX ORDER — LEVOTHYROXINE SODIUM 125 MCG
1 TABLET ORAL
Qty: 28 | Refills: 0
Start: 2021-01-22 | End: 2021-02-18

## 2021-01-22 RX ORDER — RISPERIDONE 4 MG/1
1 TABLET ORAL
Qty: 60 | Refills: 0
Start: 2021-01-22 | End: 2021-02-20

## 2021-01-22 RX ORDER — CLONAZEPAM 1 MG
0.5 TABLET ORAL DAILY
Refills: 0 | Status: DISCONTINUED | OUTPATIENT
Start: 2021-01-23 | End: 2021-01-22

## 2021-01-22 RX ORDER — CLONAZEPAM 1 MG
1 TABLET ORAL
Qty: 30 | Refills: 0
Start: 2021-01-22 | End: 2021-02-20

## 2021-01-22 RX ORDER — METOPROLOL TARTRATE 50 MG
1 TABLET ORAL
Qty: 28 | Refills: 0
Start: 2021-01-22 | End: 2021-02-18

## 2021-01-22 RX ORDER — ATORVASTATIN CALCIUM 80 MG/1
1 TABLET, FILM COATED ORAL
Qty: 28 | Refills: 0
Start: 2021-01-22 | End: 2021-02-18

## 2021-01-22 RX ORDER — DOXAZOSIN MESYLATE 4 MG
1 TABLET ORAL
Qty: 0 | Refills: 0 | DISCHARGE

## 2021-01-22 RX ORDER — LEVOTHYROXINE SODIUM 125 MCG
1 TABLET ORAL
Qty: 0 | Refills: 0 | DISCHARGE

## 2021-01-22 RX ORDER — APIXABAN 2.5 MG/1
1 TABLET, FILM COATED ORAL
Qty: 0 | Refills: 0 | DISCHARGE

## 2021-01-22 RX ORDER — DIVALPROEX SODIUM 500 MG/1
3 TABLET, DELAYED RELEASE ORAL
Qty: 84 | Refills: 0
Start: 2021-01-22 | End: 2021-02-18

## 2021-01-22 RX ORDER — APIXABAN 2.5 MG/1
1 TABLET, FILM COATED ORAL
Qty: 56 | Refills: 0
Start: 2021-01-22 | End: 2021-02-18

## 2021-01-22 RX ORDER — ASPIRIN/CALCIUM CARB/MAGNESIUM 324 MG
1 TABLET ORAL
Qty: 0 | Refills: 0 | DISCHARGE

## 2021-01-22 RX ORDER — DOXAZOSIN MESYLATE 4 MG
1 TABLET ORAL
Qty: 28 | Refills: 0
Start: 2021-01-22 | End: 2021-02-18

## 2021-01-22 RX ORDER — RISPERIDONE 4 MG/1
0 TABLET ORAL
Qty: 0 | Refills: 0 | DISCHARGE

## 2021-01-22 RX ORDER — ASPIRIN/CALCIUM CARB/MAGNESIUM 324 MG
1 TABLET ORAL
Qty: 28 | Refills: 0
Start: 2021-01-22 | End: 2021-02-18

## 2021-01-22 RX ORDER — DIVALPROEX SODIUM 500 MG/1
2 TABLET, DELAYED RELEASE ORAL
Qty: 56 | Refills: 0
Start: 2021-01-22 | End: 2021-02-18

## 2021-01-22 RX ORDER — RISPERIDONE 4 MG/1
1 TABLET ORAL
Qty: 2 | Refills: 0
Start: 2021-01-22 | End: 2021-02-18

## 2021-01-22 RX ORDER — RISPERIDONE 4 MG/1
1 TABLET ORAL
Qty: 0 | Refills: 0 | DISCHARGE

## 2021-01-22 RX ORDER — METOPROLOL TARTRATE 50 MG
1 TABLET ORAL
Qty: 0 | Refills: 0 | DISCHARGE

## 2021-01-22 RX ADMIN — Medication 25 MILLIGRAM(S): at 06:49

## 2021-01-22 RX ADMIN — Medication 25 MICROGRAM(S): at 06:49

## 2021-01-22 RX ADMIN — Medication 0.5 MILLIGRAM(S): at 06:49

## 2021-01-22 RX ADMIN — Medication 2 MILLIGRAM(S): at 02:44

## 2021-01-22 RX ADMIN — Medication 50 MILLIGRAM(S): at 02:44

## 2021-01-22 RX ADMIN — Medication 25 MILLIGRAM(S): at 09:31

## 2021-01-22 RX ADMIN — HALOPERIDOL DECANOATE 5 MILLIGRAM(S): 100 INJECTION INTRAMUSCULAR at 02:44

## 2021-01-22 RX ADMIN — HALOPERIDOL DECANOATE 5 MILLIGRAM(S): 100 INJECTION INTRAMUSCULAR at 09:32

## 2021-01-22 RX ADMIN — Medication 2 MILLIGRAM(S): at 09:32

## 2021-01-22 NOTE — PROGRESS NOTE BEHAVIORAL HEALTH - NSBHCHARTREVIEWVS_PSY_A_CORE FT
Vital Signs Last 24 Hrs  T(C): 36.5 (21 Jan 2021 16:33), Max: 36.6 (20 Jan 2021 21:09)  T(F): 97.7 (21 Jan 2021 16:33), Max: 97.9 (20 Jan 2021 21:09)  HR: 60 (21 Jan 2021 16:33) (58 - 67)  BP: 142/83 (21 Jan 2021 16:33) (130/79 - 147/84)  BP(mean): --  RR: 16 (21 Jan 2021 16:33) (16 - 16)  SpO2: 97% (21 Jan 2021 16:33) (97% - 98%)
Vital Signs Last 24 Hrs  T(C): 36.1 (20 Jan 2021 16:01), Max: 36.6 (19 Jan 2021 21:26)  T(F): 97 (20 Jan 2021 16:01), Max: 97.9 (20 Jan 2021 05:46)  HR: 65 (20 Jan 2021 16:01) (59 - 65)  BP: 136/79 (20 Jan 2021 16:01) (132/83 - 163/63)  BP(mean): 99 (20 Jan 2021 15:30) (90 - 100)  RR: 16 (20 Jan 2021 16:01) (12 - 18)  SpO2: 97% (20 Jan 2021 16:01) (95% - 100%)
Vital Signs Last 24 Hrs  T(C): 36.4 (22 Jan 2021 12:20), Max: 36.6 (21 Jan 2021 20:57)  T(F): 97.5 (22 Jan 2021 12:20), Max: 97.9 (21 Jan 2021 20:57)  HR: 55 (22 Jan 2021 12:20) (55 - 69)  BP: 155/74 (22 Jan 2021 12:20) (142/83 - 159/78)  BP(mean): --  RR: 16 (22 Jan 2021 12:20) (16 - 16)  SpO2: 98% (22 Jan 2021 12:20) (96% - 99%)

## 2021-01-22 NOTE — PROGRESS NOTE BEHAVIORAL HEALTH - NSBHCHARTREVIEWINVESTIGATE_PSY_A_CORE FT
Ventricular Rate 59 BPM    Atrial Rate 59 BPM    P-R Interval 166 ms    QRS Duration 76 ms    Q-T Interval 420 ms    QTC Calculation(Bazett) 415 ms    P Axis 51 degrees    R Axis 16 degrees    T Axis 48 degrees    Diagnosis Line SINUS BRADYCARDIA  OTHERWISE NORMAL ECG  NO PREVIOUS ECGS AVAILABLE  Confirmed by MD DEE, DALE ([id number]) on 1/20/2021 2:31:12 PM

## 2021-01-22 NOTE — PROGRESS NOTE BEHAVIORAL HEALTH - NSBHCONSULTMEDAGITATION_PSY_A_CORE FT
Haldol 5mg IM q6hrs prn Agitation  Ativan 2mg IM q6hrs prn Agitation  Benadryl 25mg IM q6hrs prn was ordered  Benadryl 25mg IM q6 hrs prn EPS prevention.

## 2021-01-22 NOTE — PROGRESS NOTE ADULT - ASSESSMENT
60M PMH BPH, HTN, HLD, DM2, schizophrenia, seizures, residing at [facility] presenting after unwitnessed fall on ASA, found to have L C4-5 TP fx.     Plan:  - Pain management PRN  - Per NSx, can D/C C-collar, outpatient f/u w/ Dr. Pena  - PT evaluation  - Appreciate [facility] Consult: no psychiatric contraindications to discharge  - Tertiary survey: Update: Patient refused tertiary survey  - R deltoid weakness likely 2/2 chronic rotator cuff injury  - Med Rec completed and confirmed  - Regular diet  - DVT ppx: lovenox  - DC today, return to group home      Trauma  p. [phone]

## 2021-01-22 NOTE — DISCHARGE NOTE NURSING/CASE MANAGEMENT/SOCIAL WORK - NSDCFUADDAPPT_GEN_ALL_CORE_FT
Please make an appointment and follow up outpatient with Dr. Pnieda in 2 weeks    Please make an appointment and follow up outpatient with Dr. Pena (Neurosurgery) in 1 week  Please make an appointment and follow up with your Primary Care Physician in 1-2 weeks

## 2021-01-22 NOTE — PROGRESS NOTE BEHAVIORAL HEALTH - NSBHFUPINTERVALHXFT_PSY_A_CORE
Pt was seen earlier today and appeared drowsy secondary to the Clonazepam 0.5mg TID which was decreased to once daily.  He has missed his dose of Risperidone Consta which could not be ordered here but he may resume this at his residence. He required a dose of Haldol 5mg, Ativan 2mg, and Benadryl 25mg as prns during his stay here.  PT may benefit from taking Clonazepam 0.5mg po daily for anxiety.
Pt is paranoid and was refusing medication this morning.  He has missed his dose of Risperidone Consta which will be ordered. He required a dose of Haldol 5mg, Ativan 2mg, and Benadryl 25mg.
Pt is paranoid and was refusing medication this morning.  He has missed his dose of Risperidone Consta which could not be ordered. He required a dose of Haldol 5mg, Ativan 2mg, and Benadryl 25mg.  PT may benefit from taking Clonazepam 0.5mg po TID for his agitation

## 2021-01-22 NOTE — PROGRESS NOTE BEHAVIORAL HEALTH - RISK ASSESSMENT
pt remains a chronic risk because of his psychiatric illness

## 2021-01-22 NOTE — PROGRESS NOTE ADULT - SUBJECTIVE AND OBJECTIVE BOX
ACS Progress Note    Injuries: Left C4-C5 transverse process fracture      S: Pt seen and examined at bedside this AM.  No acute events overnight.  Did not speak on AM rounds.     O:  Vital Signs Last 24 Hrs  T(C): 36.6 (22 Jan 2021 05:27), Max: 36.6 (21 Jan 2021 09:20)  T(F): 97.9 (22 Jan 2021 05:27), Max: 97.9 (21 Jan 2021 20:57)  HR: 64 (22 Jan 2021 06:47) (60 - 69)  BP: 158/72 (22 Jan 2021 06:47) (142/83 - 159/78)  BP(mean): --  RR: 16 (22 Jan 2021 05:27) (16 - 16)  SpO2: 96% (22 Jan 2021 05:27) (96% - 98%)    Physical Exam:  General Appearance:  Appears well, NAD, A&O x2  HEENT: c-spine collar not in place.   (replaced by residents)  Chest: Equal expansion bilaterally, equal breath sounds  CV: Pulse regular presently  Abdomen: Soft, nondistended, nontender.  Extremities:  Grossly symmetric, warm and well perfused 4x. mild left shoulder pain.      I&O's Detail    21 Jan 2021 07:01  -  22 Jan 2021 07:00  --------------------------------------------------------  IN:    Oral Fluid: 960 mL  Total IN: 960 mL    OUT:  Total OUT: 0 mL    Total NET: 960 mL            MEDICATIONS  (STANDING):  atorvastatin 40 milliGRAM(s) Oral at bedtime  dextrose 5%. 1000 milliLiter(s) (100 mL/Hr) IV Continuous <Continuous>  dextrose 50% Injectable 12.5 Gram(s) IV Push once  diVALproex DR 1000 milliGRAM(s) Oral at bedtime  diVALproex  milliGRAM(s) Oral daily  doxazosin 8 milliGRAM(s) Oral at bedtime  enoxaparin Injectable 40 milliGRAM(s) SubCutaneous daily  levothyroxine 25 MICROGram(s) Oral daily  metoprolol succinate ER 25 milliGRAM(s) Oral daily  risperiDONE   Tablet 3 milliGRAM(s) Oral daily    MEDICATIONS  (PRN):  diphenhydrAMINE   Injectable 25 milliGRAM(s) IntraMuscular every 6 hours PRN Agitation/EPS/Rash and/or Itching  haloperidol    Injectable 5 milliGRAM(s) IntraMuscular every 6 hours PRN Agitation  LORazepam   Injectable 2 milliGRAM(s) IntraMuscular every 6 hours PRN Agitation

## 2021-01-22 NOTE — PROGRESS NOTE BEHAVIORAL HEALTH - NS ED [facility] MED ROS PSYCHIATRIC
EXAM DESCRIPTION:  CHEST SINGLE VIEW



COMPLETED DATE/TIME:  2/20/2018 3:20 pm



REASON FOR STUDY:  SOB



COMPARISON:  7/28/2017



EXAM PARAMETERS:  NUMBER OF VIEWS: One view.

TECHNIQUE: Single frontal radiographic view of the chest acquired.

RADIATION DOSE: NA

LIMITATIONS: None.



FINDINGS:  LUNGS AND PLEURA: No opacities, masses or pneumothorax. No pleural effusion.

MEDIASTINUM AND HILAR STRUCTURES: No masses.  Contour normal.

HEART AND VASCULAR STRUCTURES: Stable cardiomegaly

BONES: No acute findings.

HARDWARE: Pacing defibrillator leads intact.  Midline sternotomy wires.

OTHER: No other significant finding.



IMPRESSION:  Stable cardiomegaly.  No active pulmonary disease.



TECHNICAL DOCUMENTATION:  JOB ID:  [id number]

 2011 [employer]- All Rights Reserved
See HPI

## 2021-01-22 NOTE — DISCHARGE NOTE NURSING/CASE MANAGEMENT/SOCIAL WORK - PATIENT PORTAL LINK FT
You can access the FollowMyHealth Patient Portal offered by [facility] by registering at the following website: [email or web address]. By joining [facility]’s FollowMyHealth portal, you will also be able to view your health information using other applications (apps) compatible with our system.

## 2021-01-22 NOTE — PROGRESS NOTE BEHAVIORAL HEALTH - NSBHCONSULTMEDS_PSY_A_CORE FT
-c/w home dose of Depakote 750mg daily 1000mg POqhs at bedtime  Risperidone 3mg increased to BID  Risperidone Consta injection 50mg IM [id number]  Start Clonazepam 0.5mgpo daily for agitation and anxiety
-c/w home dose of Depakote 750mg daily 1000mg POqhs at bedtime  Risperidone 3mg increased to BID  Risperidone Consta injection 50mg IM [id number]
-c/w home dose of Depakote 750mg daily 1000mg POqhs at bedtime  Risperidone 3mg increased to BID  Risperidone Consta injection 50mg IM [id number]  Start Clonazepam 0.5mgpo TID for agitation and anxiety

## 2021-01-22 NOTE — PROGRESS NOTE ADULT - REASON FOR ADMISSION
cervical spine fracture s/p mechanical fall

## 2021-01-22 NOTE — PROGRESS NOTE BEHAVIORAL HEALTH - SUMMARY
Pt is a 61 yo man domiciled at [facility] w/ a PPHx significant for schizophrenia who presents to [facility] following transfer from [facility] for management of C-spine fractures secondary to fall. Psychiatry was consulted for management of pt's outpatient psychiatric medications, and his uncontrolled behavior today, yelling at his nurse and other staff.    On interview, pt was angry, agitated, and paranoid. Pt refused to respond to most questions. He stated he would only speak with his  and when told that psychiatry was going to evaluate his medications, he replied "you need to get your meds checked." Pt then began to angrily address "Jodi Robertson," stating that he does not love her anymore and that she was a person he knew who is dead and "should stay dead." Pt also insisted on taking a shower, despite being informed that he was not cleared to get out of bed secondary to his injuries.  He refused any help from staff for assistance.  Called patient's group home and spoke with both care coordination and nursing staff regarding his behavior and medications. Staff agree that his behavior has been challenging recently. He had no acute psychiatric issues and no recent thoughts of harming himself or others.  He was scheduled to receive his Risperidone Consta 50mg IM dose yesterday but did not receive it so this will be ordered today.   Pt w/ a PPHx of schizophrenia hospitalized for management of C-spine fractures now found to be agitated and requiring management of his psychiatric medications.  Please obtain ECG (would hold antipsychotic medications for QTC over 500ms)  Haldol 5mg IM q 6hrs prn agitation  Ativan 2mg IM q6hrs prn agitation  Benadryl 25mg IM q6hrs prn (for EPS prophylaxis)  Increased Risperidone 3mg to BID   Started Clonazepam 0.5mg po daily.
Pt is a 61 yo man domiciled at [facility] w/ a PPHx significant for schizophrenia who presents to [facility] following transfer from [facility] for management of C-spine fractures secondary to fall. Psychiatry was consulted for management of pt's outpatient psychiatric medications, and his uncontrolled behavior today, yelling at his nurse and other staff.    On interview, pt was angry, agitated, and paranoid. Pt refused to respond to most questions. He stated he would only speak with his  and when told that psychiatry was going to evaluate his medications, he replied "you need to get your meds checked." Pt then began to angrily address "Jodi Robertson," stating that he does not love her anymore and that she was a person he knew who is dead and "should stay dead." Pt also insisted on taking a shower, despite being informed that he was not cleared to get out of bed secondary to his injuries.  He refused any help from staff for assistance.  Called patient's group home and spoke with both care coordination and nursing staff regarding his behavior and medications. Staff agree that his behavior has been challenging recently. He had no acute psychiatric issues and no recent thoughts of harming himself or others.  He was scheduled to receive his Risperidone Consta 50mg IM dose yesterday but did not receive it so this will be ordered today.   Pt w/ a PPHx of schizophrenia hospitalized for management of C-spine fractures now found to be agitated and requiring management of his psychiatric medications.  Please obtain ECG (would hold antipsychotic medications for QTC over 500ms)  Haldol 5mg IM q 6hrs prn agitation  Ativan 2mg IM q6hrs prn agitation  Benadryl 25mg IM q6hrs prn (for EPS prophylaxis)
Pt is a 61 yo man domiciled at [facility] w/ a PPHx significant for schizophrenia who presents to [facility] following transfer from [facility] for management of C-spine fractures secondary to fall. Psychiatry was consulted for management of pt's outpatient psychiatric medications, and his uncontrolled behavior today, yelling at his nurse and other staff.    On interview, pt was angry, agitated, and paranoid. Pt refused to respond to most questions. He stated he would only speak with his  and when told that psychiatry was going to evaluate his medications, he replied "you need to get your meds checked." Pt then began to angrily address "Jodi Robertson," stating that he does not love her anymore and that she was a person he knew who is dead and "should stay dead." Pt also insisted on taking a shower, despite being informed that he was not cleared to get out of bed secondary to his injuries.  He refused any help from staff for assistance.  Called patient's group home and spoke with both care coordination and nursing staff regarding his behavior and medications. Staff agree that his behavior has been challenging recently. He had no acute psychiatric issues and no recent thoughts of harming himself or others.  He was scheduled to receive his Risperidone Consta 50mg IM dose yesterday but did not receive it so this will be ordered today.   Pt w/ a PPHx of schizophrenia hospitalized for management of C-spine fractures now found to be agitated and requiring management of his psychiatric medications.  Please obtain ECG (would hold antipsychotic medications for QTC over 500ms)  Haldol 5mg IM q 6hrs prn agitation  Ativan 2mg IM q6hrs prn agitation  Benadryl 25mg IM q6hrs prn (for EPS prophylaxis)

## 2022-12-06 NOTE — BEHAVIORAL HEALTH ASSESSMENT NOTE - NSBHREFERIPTEAMCONTACT_PSY_A_CORE_FT
31798 Thalidomide Counseling: I discussed with the patient the risks of thalidomide including but not limited to birth defects, anxiety, weakness, chest pain, dizziness, cough and severe allergy.

## 2023-01-10 NOTE — H&P ADULT - NSHPSOCIALHISTORY_GEN_ALL_CORE
Detail Level: Detailed Duration Of Freeze Thaw-Cycle (Seconds): 0 Post-Care Instructions: I reviewed with the patient in detail post-care instructions. Patient is to wear sunprotection, and avoid picking at any of the treated lesions. Pt may apply Vaseline to crusted or scabbing areas. Render Note In Bullet Format When Appropriate: No Show Applicator Variable?: Yes Consent: The patient's consent was obtained including but not limited to risks of crusting, scabbing, blistering, scarring, darker or lighter pigmentary change, recurrence, incomplete removal and infection. resides at [facility]

## 2023-07-27 NOTE — ED ADULT NURSE REASSESSMENT NOTE - NS ED NURSE REASSESS COMMENT FT1
Patient aware of plan of care for admission and plan to await bed assignment. Patient offered food but declined. Called pharmacy for pending meds.
Received report from previous shift RN. Patient resting in bed with no acute distress noted, with c-collar in place. Patient A&Ox3, aware of situation and pending evaluation for possible discharge. Patient slow to respond to questioning, at baseline per previous shift RN. Patient denies current HA, dizziness, n/v, numbness, tingling. Patient endorses blurred vision since he does not have his glasses. Patient moves all extremities without difficulty. Patient denies current pain or discomfort. Pending dispo. VSS.
Report given to holding RN . Pt aware. Pt currently comfortable. VSS. Patient provided with sandwich
patient ambulated with MD at bedside. patient was unsteady and needed assistance. patient states he uses a cane at home.
(2) well flexed

## 2024-09-19 NOTE — ED ADULT NURSE NOTE - NS PRO PASSIVE SMOKE EXP
Denies h/o or family h/o DVT, PE  Denies bleeding or clotting disorders  Denies dentures/partials, loose teeth/caps Unknown

## 2025-06-10 NOTE — PATIENT PROFILE ADULT - HAS THE PATIENT RECEIVED THE INFLUENZA VACCINE THIS SEASON?
Exam findings: Benign appearing macules and papules  Plan: monitor for any new or changing nevi. Notify me should this occur.  Over the counter use of sun screen product (30+ SPF with mineral sun screen) recommended    
unable to assess immunization status...
The patient is a 52y Female complaining of abdominal pain.